# Patient Record
Sex: FEMALE | Race: WHITE | NOT HISPANIC OR LATINO | Employment: FULL TIME | URBAN - METROPOLITAN AREA
[De-identification: names, ages, dates, MRNs, and addresses within clinical notes are randomized per-mention and may not be internally consistent; named-entity substitution may affect disease eponyms.]

---

## 2022-04-26 LAB
EXTERNAL CHLAMYDIA SCREEN: NEGATIVE
EXTERNAL GONORRHEA SCREEN: NEGATIVE

## 2022-11-07 LAB — EXTERNAL GROUP B STREP ANTIGEN: POSITIVE

## 2022-11-16 ENCOUNTER — ROUTINE PRENATAL (OUTPATIENT)
Dept: OBGYN CLINIC | Facility: CLINIC | Age: 27
End: 2022-11-16

## 2022-11-16 VITALS
WEIGHT: 143.2 LBS | SYSTOLIC BLOOD PRESSURE: 102 MMHG | HEIGHT: 63 IN | DIASTOLIC BLOOD PRESSURE: 70 MMHG | BODY MASS INDEX: 25.37 KG/M2

## 2022-11-16 DIAGNOSIS — Z28.310 COVID-19 VACCINE SERIES DECLINED: ICD-10-CM

## 2022-11-16 DIAGNOSIS — Z28.21 COVID-19 VACCINE SERIES DECLINED: ICD-10-CM

## 2022-11-16 DIAGNOSIS — O99.820 GBS (GROUP B STREPTOCOCCUS CARRIER), +RV CULTURE, CURRENTLY PREGNANT: ICD-10-CM

## 2022-11-16 DIAGNOSIS — O21.9 NAUSEA AND VOMITING OF PREGNANCY, ANTEPARTUM: ICD-10-CM

## 2022-11-16 DIAGNOSIS — O99.013 ANEMIA DURING PREGNANCY IN THIRD TRIMESTER: ICD-10-CM

## 2022-11-16 DIAGNOSIS — Z3A.37 37 WEEKS GESTATION OF PREGNANCY: Primary | ICD-10-CM

## 2022-11-16 LAB
SL AMB  POCT GLUCOSE, UA: NEGATIVE
SL AMB POCT URINE PROTEIN: NEGATIVE

## 2022-11-16 NOTE — PROGRESS NOTES
Routine Prenatal Visit  [address]   [address], [address], [facility], [facility] 1    Assessment/Plan:  Yun Salas is a 24y o  year old  at 37w6d who presents for routine prenatal visit  Patient considering elective IOL the week of , will inform OB/Gyn after discussing with spouse  Last day of work 2022       1  37 weeks gestation of pregnancy  -     POCT urine dip    2  Anemia during pregnancy in third trimester    3  COVID-19 vaccine series declined    4  Nausea and vomiting of pregnancy, antepartum    5  GBS (group B Streptococcus carrier), +RV culture, currently pregnant        Next OB Visit 1 weeks  Subjective:     CC: Prenatal care    Hu Wade is a 24 y o   female who presents for routine prenatal care at 37w5d  Pregnancy ROS: No leakage of fluid, pelvic pain, or vaginal bleeding  Normal fetal movement  The following portions of the patient's history were reviewed and updated as appropriate: allergies, current medications, past family history, past medical history, obstetric history, gynecologic history, past social history, past surgical history and problem list       Objective:  /70   Ht 5' 3" (1 6 m)   Wt 65 kg (143 lb 3 2 oz)   LMP 2022 (Exact Date)   BMI 25 37 kg/m²   Pregravid Weight/BMI: 49 kg (108 lb) (BMI 19 14)  Current Weight: 65 kg (143 lb 3 2 oz)   Total Weight Gain: 16 kg (35 lb 3 2 oz)   Pre- Vitals    Flowsheet Row Most Recent Value   Prenatal Assessment    Fetal Heart Rate 140-150   Fundal Height (cm) 38 cm   Movement Present   Prenatal Vitals    Blood Pressure 102/70   Weight - Scale 65 kg (143 lb 3 2 oz)   Urine Albumin/Glucose    Dilation/Effacement/Station    Vaginal Drainage    Edema    LLE Edema Trace   RLE Edema Trace   Facial Edema None           General: Well appearing, no distress  Abdomen: Soft, gravid, nontender  Fundal Height: Appropriate for gestational age  Extremities: Warm and well perfused  Non tender

## 2022-11-21 ENCOUNTER — ROUTINE PRENATAL (OUTPATIENT)
Dept: OBGYN CLINIC | Facility: CLINIC | Age: 27
End: 2022-11-21

## 2022-11-21 VITALS — BODY MASS INDEX: 25.05 KG/M2 | DIASTOLIC BLOOD PRESSURE: 68 MMHG | SYSTOLIC BLOOD PRESSURE: 106 MMHG | WEIGHT: 141.4 LBS

## 2022-11-21 DIAGNOSIS — O99.013 ANEMIA DURING PREGNANCY IN THIRD TRIMESTER: ICD-10-CM

## 2022-11-21 DIAGNOSIS — Z3A.38 38 WEEKS GESTATION OF PREGNANCY: Primary | ICD-10-CM

## 2022-11-21 DIAGNOSIS — O99.820 GBS (GROUP B STREPTOCOCCUS CARRIER), +RV CULTURE, CURRENTLY PREGNANT: ICD-10-CM

## 2022-11-21 LAB
SL AMB  POCT GLUCOSE, UA: NEGATIVE
SL AMB POCT URINE PROTEIN: NEGATIVE

## 2022-11-21 NOTE — PROGRESS NOTES
[facility]          OUTPATIENT PHYSICAL THERAPY ORTHOPEDIC EVALUATION  PLAN OF TREATMENT FOR OUTPATIENT REHABILITATION  (COMPLETE FOR INITIAL CLAIMS ONLY)  Patient's Last Name, First Name, M.I.  YOB: 1951  XiangIna  CLAUDINE    Provider s Name:  [facility]   Medical Record No.  [id number]   Start of Care Date:  09/20/18   Onset Date:  08/20/18   Type:     _X__PT   ___OT   ___SLP Medical Diagnosis:  History of right total knee replacement     PT Diagnosis:  s/p right TKA 8/20/18   Visits from SOC:  1      _________________________________________________________________________________  Plan of Treatment/Functional Goals:  ADL retraining, balance training, gait training, joint mobilization, manual therapy, motor coordination training, neuromuscular re-education, ROM, strengthening, stretching     Cryotherapy     Goals     Goal Description: Patient will have 0-125 degrees of right knee ROM to allow for normalized gait.  Target Date: 10/11/18       Goal Description: Patient will be able to perform tandem stance for >=30 seconds.  Target Date: 11/01/18       Goal Description: Patient will be independent with her HEP to reduce future occurrence of pain and disability.  Target Date: 10/11/18         Therapy Frequency:  1 time/week  Predicted Duration of Therapy Intervention:  8 weeks    Leeanna Jiang, PT                 I CERTIFY THE NEED FOR THESE SERVICES FURNISHED UNDER        THIS PLAN OF TREATMENT AND WHILE UNDER MY CARE     (Physician co-signature of this document indicates review and certification of the therapy plan).                       Certification Date From:  09/20/18   Certification Date To:  11/15/18    Referring Provider:  Verito Ashby MD    Initial Assessment        See Epic Evaluation Start of Care Date: 09/20/18                                                                               Routine Prenatal Visit  [address]   [address] [facility], [place], [address]    Assessment/Plan:  Kelsy Chou is a 24y o  year old  at 36w4d who presents for routine prenatal visit  1  38 weeks gestation of pregnancy  Assessment & Plan:  - Labor/Bleeding/ROM precautions given  Kick counts reviewed  - GBS positive result reviewed  - Reviewed timing of delivery, including option for elective induction of labor as early as 39 weeks versus awaiting spontaneous onset of labor  Patient desires induction of labor  Tentatively scheduled for cervical ripening  at 20:00  Given unfavorable cervix, she will return to office in the morning on  for cervical exam    - Problem list updated, results console reviewed and updated with pertinent prenatal labs  - PMH, PSH, medications reviewed and updated as needed  - Return to office in 1 wk(s) for routine prenatal care      Orders:  -     POCT urine dip    2  GBS (group B Streptococcus carrier), +RV culture, currently pregnant    3  Anemia during pregnancy in third trimester  Assessment & Plan:  - Continue oral iron supplementation            Subjective:   Amy Cannon is a 24 y o   who presents for routine prenatal care at 38w3d  Complaints today: No  LOF: No; VB: No; Contractions: No; FM: Present and normal    Objective:  /68   Wt 64 1 kg (141 lb 6 4 oz)   LMP 2022 (Exact Date)   BMI 25 05 kg/m²     General: Well appearing, no distress  Respiratory: Unlabored breathing  Cardiovascular: Regular rate  Abdomen: Soft, gravid, nontender  Extremities: Warm and well perfused  Non tender      Pregravid Weight/BMI: 49 kg (108 lb) (BMI 19 14)  Current Weight: 64 1 kg (141 lb 6 4 oz)   Total Weight Gain: 15 2 kg (33 lb 6 4 oz)     Pre- Vitals    Flowsheet Row Most Recent Value   Prenatal Assessment    Fetal Heart Rate 120   Fundal Height (cm) 38 cm   Movement Present   Presentation Vertex   Prenatal Vitals    Blood Pressure 106/68   Weight - Scale 64 1 kg (141 lb 6 4 oz)   Urine Albumin/Glucose    Dilation/Effacement/Station    Cervical Dilation 0   Cervical Effacement 50   Fetal Station -3   Vaginal Drainage    Draining Fluid No   Edema    LLE Edema None   RLE Edema None   Facial Edema None           Maeve Estrada MD  11/21/2022 12:22 PM

## 2022-11-21 NOTE — ASSESSMENT & PLAN NOTE
- Labor/Bleeding/ROM precautions given  Kick counts reviewed  - GBS positive result reviewed  - Reviewed timing of delivery, including option for elective induction of labor as early as 39 weeks versus awaiting spontaneous onset of labor  Patient desires induction of labor  Tentatively scheduled for cervical ripening 11/28 at 20:00  Given unfavorable cervix, she will return to office in the morning on 11/28 for cervical exam    - Problem list updated, results console reviewed and updated with pertinent prenatal labs    - PMH, PSH, medications reviewed and updated as needed  - Return to office in 1 wk(s) for routine prenatal care

## 2022-11-28 ENCOUNTER — HOSPITAL ENCOUNTER (INPATIENT)
Facility: HOSPITAL | Age: 27
LOS: 3 days | Discharge: HOME | End: 2022-12-01
Attending: OBSTETRICS & GYNECOLOGY | Admitting: OBSTETRICS & GYNECOLOGY

## 2022-11-28 ENCOUNTER — ANESTHESIA EVENT (INPATIENT)
Dept: ANESTHESIOLOGY | Facility: HOSPITAL | Age: 27
End: 2022-11-28

## 2022-11-28 ENCOUNTER — ANESTHESIA (INPATIENT)
Dept: ANESTHESIOLOGY | Facility: HOSPITAL | Age: 27
End: 2022-11-28

## 2022-11-28 ENCOUNTER — HOSPITAL ENCOUNTER (EMERGENCY)
Dept: LABOR AND DELIVERY | Facility: HOSPITAL | Age: 27
Discharge: HOME | End: 2022-11-28

## 2022-11-28 ENCOUNTER — ROUTINE PRENATAL (OUTPATIENT)
Dept: OBGYN CLINIC | Facility: CLINIC | Age: 27
End: 2022-11-28

## 2022-11-28 VITALS — SYSTOLIC BLOOD PRESSURE: 100 MMHG | BODY MASS INDEX: 25.08 KG/M2 | WEIGHT: 141.6 LBS | DIASTOLIC BLOOD PRESSURE: 70 MMHG

## 2022-11-28 DIAGNOSIS — Z34.90 ENCOUNTER FOR ELECTIVE INDUCTION OF LABOR: Primary | ICD-10-CM

## 2022-11-28 DIAGNOSIS — O99.013 ANEMIA DURING PREGNANCY IN THIRD TRIMESTER: Primary | ICD-10-CM

## 2022-11-28 DIAGNOSIS — O99.820 GBS (GROUP B STREPTOCOCCUS CARRIER), +RV CULTURE, CURRENTLY PREGNANT: ICD-10-CM

## 2022-11-28 DIAGNOSIS — M79.609 LIMB PAIN: ICD-10-CM

## 2022-11-28 DIAGNOSIS — Z3A.39 39 WEEKS GESTATION OF PREGNANCY: ICD-10-CM

## 2022-11-28 LAB
ABO GROUP BLD: NORMAL
BASOPHILS # BLD AUTO: 0.01 THOUSANDS/ÂΜL (ref 0–0.1)
BASOPHILS NFR BLD AUTO: 0 % (ref 0–1)
BLD GP AB SCN SERPL QL: NEGATIVE
EOSINOPHIL # BLD AUTO: 0.04 THOUSAND/ÂΜL (ref 0–0.61)
EOSINOPHIL NFR BLD AUTO: 0 % (ref 0–6)
ERYTHROCYTE [DISTWIDTH] IN BLOOD BY AUTOMATED COUNT: 13 % (ref 11.6–15.1)
HCT VFR BLD AUTO: 35.7 % (ref 34.8–46.1)
HGB BLD-MCNC: 12.1 G/DL (ref 11.5–15.4)
IMM GRANULOCYTES # BLD AUTO: 0.03 THOUSAND/UL (ref 0–0.2)
IMM GRANULOCYTES NFR BLD AUTO: 0 % (ref 0–2)
LYMPHOCYTES # BLD AUTO: 1.8 THOUSANDS/ÂΜL (ref 0.6–4.47)
LYMPHOCYTES NFR BLD AUTO: 19 % (ref 14–44)
MCH RBC QN AUTO: 31.6 PG (ref 26.8–34.3)
MCHC RBC AUTO-ENTMCNC: 33.9 G/DL (ref 31.4–37.4)
MCV RBC AUTO: 93 FL (ref 82–98)
MONOCYTES # BLD AUTO: 0.39 THOUSAND/ÂΜL (ref 0.17–1.22)
MONOCYTES NFR BLD AUTO: 4 % (ref 4–12)
NEUTROPHILS # BLD AUTO: 7.3 THOUSANDS/ÂΜL (ref 1.85–7.62)
NEUTS SEG NFR BLD AUTO: 77 % (ref 43–75)
NRBC BLD AUTO-RTO: 0 /100 WBCS
PLATELET # BLD AUTO: 219 THOUSANDS/UL (ref 149–390)
PMV BLD AUTO: 11.4 FL (ref 8.9–12.7)
RBC # BLD AUTO: 3.83 MILLION/UL (ref 3.81–5.12)
RH BLD: POSITIVE
SL AMB  POCT GLUCOSE, UA: NEGATIVE
SL AMB POCT URINE PROTEIN: NEGATIVE
SPECIMEN EXPIRATION DATE: NORMAL
WBC # BLD AUTO: 9.57 THOUSAND/UL (ref 4.31–10.16)

## 2022-11-28 PROCEDURE — 4A1HXCZ MONITORING OF PRODUCTS OF CONCEPTION, CARDIAC RATE, EXTERNAL APPROACH: ICD-10-PCS | Performed by: OBSTETRICS & GYNECOLOGY

## 2022-11-28 RX ORDER — SODIUM CHLORIDE, SODIUM LACTATE, POTASSIUM CHLORIDE, CALCIUM CHLORIDE 600; 310; 30; 20 MG/100ML; MG/100ML; MG/100ML; MG/100ML
125 INJECTION, SOLUTION INTRAVENOUS CONTINUOUS
Status: DISCONTINUED | OUTPATIENT
Start: 2022-11-28 | End: 2022-11-29

## 2022-11-28 RX ORDER — ONDANSETRON 2 MG/ML
4 INJECTION INTRAMUSCULAR; INTRAVENOUS EVERY 6 HOURS PRN
Status: DISCONTINUED | OUTPATIENT
Start: 2022-11-28 | End: 2022-11-29

## 2022-11-28 RX ORDER — BUTORPHANOL TARTRATE 1 MG/ML
1 INJECTION, SOLUTION INTRAMUSCULAR; INTRAVENOUS
Status: DISCONTINUED | OUTPATIENT
Start: 2022-11-28 | End: 2022-11-29

## 2022-11-28 RX ADMIN — Medication 50 MCG: at 21:12

## 2022-11-28 NOTE — ASSESSMENT & PLAN NOTE
- Labor/Bleeding/ROM precautions given  Kick counts reviewed  - GBS positive result reviewed  - Patient is scheduled for induction tonight  Her cervix remains unfavorable today and will therefore need cervical ripening  We discussed west balloon and/or misoprostol for cervical ripening followed by pitocin  Induction consent reviewed and signed with patient today  - Problem list updated, results console reviewed and updated with pertinent prenatal labs  - PMH, PSH, medications reviewed and updated as needed  - Return to office for postpartum care

## 2022-11-28 NOTE — PROGRESS NOTES
Routine Prenatal Visit   [address]    [address] [facility], [address]    Assessment/Plan:  Caridad Britt is a 24y o  year old  at 38w3d who presents for routine prenatal visit  1  Anemia during pregnancy in third trimester  Assessment & Plan:  - Continue oral iron supplementation  2  39 weeks gestation of pregnancy  Assessment & Plan:  - Labor/Bleeding/ROM precautions given  Kick counts reviewed  - GBS positive result reviewed  - Patient is scheduled for induction tonight  Her cervix remains unfavorable today and will therefore need cervical ripening  We discussed west balloon and/or misoprostol for cervical ripening followed by pitocin  Induction consent reviewed and signed with patient today  - Problem list updated, results console reviewed and updated with pertinent prenatal labs  - PMH, PSH, medications reviewed and updated as needed  - Return to office for postpartum care  Orders:  -     POCT urine dip    3  GBS (group B Streptococcus carrier), +RV culture, currently pregnant          Subjective:   Brenda Carty is a 24 y o   who presents for routine prenatal care at 39w3d  Complaints today: No  LOF: No; VB: No; Contractions: No; FM: Present and normal    Objective:  /70   Wt 64 2 kg (141 lb 9 6 oz)   LMP 2022 (Exact Date)   BMI 25 08 kg/m²     General: Well appearing, no distress  Respiratory: Unlabored breathing  Cardiovascular: Regular rate  Abdomen: Soft, gravid, nontender  Extremities: Warm and well perfused  Non tender      Pregravid Weight/BMI: 49 kg (108 lb) (BMI 19 14)  Current Weight: 64 2 kg (141 lb 9 6 oz)   Total Weight Gain: 15 2 kg (33 lb 9 6 oz)     Pre-Tiny Vitals    Flowsheet Row Most Recent Value   Prenatal Assessment    Fetal Heart Rate 135   Fundal Height (cm) 39 cm   Movement Present   Presentation Vertex   Prenatal Vitals    Blood Pressure 100/70   Weight - Scale 64 2 kg (141 lb 9 6 oz)   Urine Albumin/Glucose Dilation/Effacement/Station    Cervical Dilation 0   Cervical Effacement 50   Fetal Station -3   Vaginal Drainage    Draining Fluid No   Edema    LLE Edema None   RLE Edema None   Facial Edema None           Cornelius Ayala MD  11/28/2022 10:03 AM

## 2022-11-29 LAB
BASE EXCESS BLDCOA CALC-SCNC: -8.7 MMOL/L (ref 3–11)
BASE EXCESS BLDCOV CALC-SCNC: -8.2 MMOL/L (ref 1–9)
HCO3 BLDCOA-SCNC: 21 MMOL/L (ref 17.3–27.3)
HCO3 BLDCOV-SCNC: 19.3 MMOL/L (ref 12.2–28.6)
O2 CT VFR BLDCOA CALC: 3.6 ML/DL
OXYHGB MFR BLDCOA: 15.9 %
OXYHGB MFR BLDCOV: 52.6 %
PCO2 BLDCOA: 60.6 MM[HG] (ref 30–60)
PCO2 BLDCOV: 46.8 MM HG (ref 27–43)
PH BLDCOA: 7.16 [PH] (ref 7.23–7.43)
PH BLDCOV: 7.23 [PH] (ref 7.19–7.49)
PO2 BLDCOA: 13.3 MM HG (ref 5–25)
PO2 BLDCOV: 25.3 MM HG (ref 15–45)
RPR SER QL: NORMAL
SAO2 % BLDCOV: 12.4 ML/DL

## 2022-11-29 PROCEDURE — 0KQM0ZZ REPAIR PERINEUM MUSCLE, OPEN APPROACH: ICD-10-PCS | Performed by: OBSTETRICS & GYNECOLOGY

## 2022-11-29 PROCEDURE — 3E0E7GC INTRODUCTION OF OTHER THERAPEUTIC SUBSTANCE INTO PRODUCTS OF CONCEPTION, VIA NATURAL OR ARTIFICIAL OPENING: ICD-10-PCS | Performed by: OBSTETRICS & GYNECOLOGY

## 2022-11-29 PROCEDURE — 3E033VJ INTRODUCTION OF OTHER HORMONE INTO PERIPHERAL VEIN, PERCUTANEOUS APPROACH: ICD-10-PCS | Performed by: OBSTETRICS & GYNECOLOGY

## 2022-11-29 PROCEDURE — 3E0DXGC INTRODUCTION OF OTHER THERAPEUTIC SUBSTANCE INTO MOUTH AND PHARYNX, EXTERNAL APPROACH: ICD-10-PCS | Performed by: OBSTETRICS & GYNECOLOGY

## 2022-11-29 PROCEDURE — 10H07YZ INSERTION OF OTHER DEVICE INTO PRODUCTS OF CONCEPTION, VIA NATURAL OR ARTIFICIAL OPENING: ICD-10-PCS | Performed by: OBSTETRICS & GYNECOLOGY

## 2022-11-29 PROCEDURE — 10907ZC DRAINAGE OF AMNIOTIC FLUID, THERAPEUTIC FROM PRODUCTS OF CONCEPTION, VIA NATURAL OR ARTIFICIAL OPENING: ICD-10-PCS | Performed by: OBSTETRICS & GYNECOLOGY

## 2022-11-29 RX ORDER — ACETAMINOPHEN 325 MG/1
650 TABLET ORAL EVERY 4 HOURS PRN
Status: DISCONTINUED | OUTPATIENT
Start: 2022-11-29 | End: 2022-12-01 | Stop reason: HOSPADM

## 2022-11-29 RX ORDER — FENTANYL CITRATE 50 UG/ML
INJECTION, SOLUTION INTRAMUSCULAR; INTRAVENOUS AS NEEDED
Status: DISCONTINUED | OUTPATIENT
Start: 2022-11-29 | End: 2022-11-29

## 2022-11-29 RX ORDER — BUPIVACAINE HYDROCHLORIDE 2.5 MG/ML
INJECTION, SOLUTION EPIDURAL; INFILTRATION; INTRACAUDAL
Status: COMPLETED | OUTPATIENT
Start: 2022-11-29 | End: 2022-11-29

## 2022-11-29 RX ORDER — DIAPER,BRIEF,INFANT-TODD,DISP
1 EACH MISCELLANEOUS DAILY PRN
Status: DISCONTINUED | OUTPATIENT
Start: 2022-11-29 | End: 2022-12-01 | Stop reason: HOSPADM

## 2022-11-29 RX ORDER — DIPHENHYDRAMINE HCL 25 MG
25 TABLET ORAL EVERY 6 HOURS PRN
Status: DISCONTINUED | OUTPATIENT
Start: 2022-11-29 | End: 2022-12-01 | Stop reason: HOSPADM

## 2022-11-29 RX ORDER — LIDOCAINE HYDROCHLORIDE AND EPINEPHRINE 15; 5 MG/ML; UG/ML
INJECTION, SOLUTION EPIDURAL AS NEEDED
Status: DISCONTINUED | OUTPATIENT
Start: 2022-11-29 | End: 2022-11-29 | Stop reason: HOSPADM

## 2022-11-29 RX ORDER — IBUPROFEN 600 MG/1
600 TABLET ORAL EVERY 6 HOURS PRN
Status: DISCONTINUED | OUTPATIENT
Start: 2022-11-29 | End: 2022-12-01 | Stop reason: HOSPADM

## 2022-11-29 RX ORDER — OXYTOCIN/RINGER'S LACTATE 30/500 ML
1-30 PLASTIC BAG, INJECTION (ML) INTRAVENOUS
Status: DISCONTINUED | OUTPATIENT
Start: 2022-11-29 | End: 2022-11-29

## 2022-11-29 RX ORDER — DOCUSATE SODIUM 100 MG/1
100 CAPSULE, LIQUID FILLED ORAL 2 TIMES DAILY
Status: DISCONTINUED | OUTPATIENT
Start: 2022-11-29 | End: 2022-12-01 | Stop reason: HOSPADM

## 2022-11-29 RX ORDER — LIDOCAINE HYDROCHLORIDE 10 MG/ML
INJECTION, SOLUTION EPIDURAL; INFILTRATION; INTRACAUDAL; PERINEURAL
Status: COMPLETED | OUTPATIENT
Start: 2022-11-29 | End: 2022-11-29

## 2022-11-29 RX ORDER — METOCLOPRAMIDE HYDROCHLORIDE 5 MG/ML
10 INJECTION INTRAMUSCULAR; INTRAVENOUS ONCE
Status: COMPLETED | OUTPATIENT
Start: 2022-11-29 | End: 2022-11-29

## 2022-11-29 RX ORDER — MAGNESIUM HYDROXIDE 1200 MG/15ML
1000 LIQUID ORAL ONCE
Status: COMPLETED | OUTPATIENT
Start: 2022-11-29 | End: 2022-11-29

## 2022-11-29 RX ORDER — CALCIUM CARBONATE 200(500)MG
1000 TABLET,CHEWABLE ORAL DAILY PRN
Status: DISCONTINUED | OUTPATIENT
Start: 2022-11-29 | End: 2022-12-01 | Stop reason: HOSPADM

## 2022-11-29 RX ORDER — ONDANSETRON 2 MG/ML
4 INJECTION INTRAMUSCULAR; INTRAVENOUS EVERY 8 HOURS PRN
Status: DISCONTINUED | OUTPATIENT
Start: 2022-11-29 | End: 2022-12-01 | Stop reason: HOSPADM

## 2022-11-29 RX ORDER — FENTANYL CITRATE 50 UG/ML
INJECTION, SOLUTION INTRAMUSCULAR; INTRAVENOUS AS NEEDED
Status: DISCONTINUED | OUTPATIENT
Start: 2022-11-29 | End: 2022-11-29 | Stop reason: HOSPADM

## 2022-11-29 RX ORDER — ROPIVACAINE HYDROCHLORIDE 2 MG/ML
INJECTION, SOLUTION EPIDURAL; INFILTRATION; PERINEURAL CONTINUOUS PRN
Status: DISCONTINUED | OUTPATIENT
Start: 2022-11-29 | End: 2022-11-29 | Stop reason: HOSPADM

## 2022-11-29 RX ADMIN — BUTORPHANOL TARTRATE 1 MG: 1 INJECTION, SOLUTION INTRAMUSCULAR; INTRAVENOUS at 06:06

## 2022-11-29 RX ADMIN — HYDROCORTISONE 1 APPLICATION: 1 CREAM TOPICAL at 23:28

## 2022-11-29 RX ADMIN — SODIUM CHLORIDE 2.5 MILLION UNITS: 9 INJECTION, SOLUTION INTRAVENOUS at 13:54

## 2022-11-29 RX ADMIN — SODIUM CHLORIDE 5 MILLION UNITS: 0.9 INJECTION, SOLUTION INTRAVENOUS at 08:20

## 2022-11-29 RX ADMIN — BUPIVACAINE HYDROCHLORIDE 3 ML: 2.5 INJECTION, SOLUTION EPIDURAL; INFILTRATION; INTRACAUDAL; PERINEURAL at 13:45

## 2022-11-29 RX ADMIN — ROPIVACAINE HYDROCHLORIDE: 2 INJECTION, SOLUTION EPIDURAL; INFILTRATION at 19:50

## 2022-11-29 RX ADMIN — SODIUM CHLORIDE, POTASSIUM CHLORIDE, SODIUM LACTATE AND CALCIUM CHLORIDE 999 ML/HR: 600; 310; 30; 20 INJECTION, SOLUTION INTRAVENOUS at 13:18

## 2022-11-29 RX ADMIN — LIDOCAINE HYDROCHLORIDE 3 ML: 10 INJECTION, SOLUTION EPIDURAL; INFILTRATION; INTRACAUDAL; PERINEURAL at 13:40

## 2022-11-29 RX ADMIN — FENTANYL CITRATE 100 MCG: 50 INJECTION, SOLUTION INTRAMUSCULAR; INTRAVENOUS at 13:48

## 2022-11-29 RX ADMIN — METOCLOPRAMIDE 10 MG: 5 INJECTION, SOLUTION INTRAMUSCULAR; INTRAVENOUS at 19:25

## 2022-11-29 RX ADMIN — ROPIVACAINE HYDROCHLORIDE: 2 INJECTION, SOLUTION EPIDURAL; INFILTRATION at 13:58

## 2022-11-29 RX ADMIN — SODIUM CHLORIDE, POTASSIUM CHLORIDE, SODIUM LACTATE AND CALCIUM CHLORIDE 125 ML/HR: 600; 310; 30; 20 INJECTION, SOLUTION INTRAVENOUS at 04:39

## 2022-11-29 RX ADMIN — SODIUM CHLORIDE 2.5 MILLION UNITS: 9 INJECTION, SOLUTION INTRAVENOUS at 18:14

## 2022-11-29 RX ADMIN — BENZOCAINE AND LEVOMENTHOL 1 APPLICATION: 200; 5 SPRAY TOPICAL at 23:28

## 2022-11-29 RX ADMIN — SODIUM CHLORIDE, POTASSIUM CHLORIDE, SODIUM LACTATE AND CALCIUM CHLORIDE 999 ML/HR: 600; 310; 30; 20 INJECTION, SOLUTION INTRAVENOUS at 18:14

## 2022-11-29 RX ADMIN — Medication 2 MILLI-UNITS/MIN: at 10:30

## 2022-11-29 RX ADMIN — BUPIVACAINE HYDROCHLORIDE 5 ML: 2.5 INJECTION, SOLUTION EPIDURAL; INFILTRATION; INTRACAUDAL; PERINEURAL at 13:42

## 2022-11-29 RX ADMIN — SODIUM CHLORIDE, POTASSIUM CHLORIDE, SODIUM LACTATE AND CALCIUM CHLORIDE 999 ML/HR: 600; 310; 30; 20 INJECTION, SOLUTION INTRAVENOUS at 02:08

## 2022-11-29 RX ADMIN — WITCH HAZEL 1 PAD: 500 SOLUTION RECTAL; TOPICAL at 23:28

## 2022-11-29 RX ADMIN — SODIUM CHLORIDE, POTASSIUM CHLORIDE, SODIUM LACTATE AND CALCIUM CHLORIDE 999 ML/HR: 600; 310; 30; 20 INJECTION, SOLUTION INTRAVENOUS at 16:52

## 2022-11-29 RX ADMIN — BUPIVACAINE HYDROCHLORIDE 5 ML: 2.5 INJECTION, SOLUTION EPIDURAL; INFILTRATION; INTRACAUDAL; PERINEURAL at 14:44

## 2022-11-29 RX ADMIN — SODIUM CHLORIDE 1000 ML: 0.9 IRRIGANT IRRIGATION at 16:08

## 2022-11-29 RX ADMIN — ONDANSETRON 4 MG: 2 INJECTION INTRAMUSCULAR; INTRAVENOUS at 14:28

## 2022-11-29 RX ADMIN — ROPIVACAINE HYDROCHLORIDE 10 ML/HR: 2 INJECTION, SOLUTION EPIDURAL; INFILTRATION at 13:49

## 2022-11-29 RX ADMIN — ONDANSETRON 4 MG: 2 INJECTION INTRAMUSCULAR; INTRAVENOUS at 09:02

## 2022-11-29 RX ADMIN — LIDOCAINE HYDROCHLORIDE AND EPINEPHRINE 3 ML: 15; 5 INJECTION, SOLUTION EPIDURAL at 13:44

## 2022-11-29 NOTE — OB LABOR/OXYTOCIN SAFETY PROGRESS
Oxytocin Safety Progress Check Note - Lizett Shelter 24 y o  female MRN: [id number]    Unit/Bed#: -01 Encounter: [id number]       Contraction Frequency (minutes): 1 5-3 5  Contraction Quality: Mild  Tachysystole: No   Cervical Dilation: Closed        Cervical Effacement: 90  Fetal Station: -1  Baseline Rate: 115 bpm  Fetal Heart Rate: 120 BPM  FHR Category: Category I               Vital Signs:   Vitals:    11/29/22 0239   BP: 132/87   Pulse: 64   Resp: 18   Temp: 97 7 °F (36 5 °C)   SpO2: 98%       Notes/comments:     Ctx still q 2 min mostly, occasionally q 3,moderately uncomfortable  Cannot use cytotec now but if continues to space out can give another dose  Unable to pass balloon as closed    Stephanie Gaming DO 11/29/2022 5:32 AM

## 2022-11-29 NOTE — PROGRESS NOTES
11/29/22 1038   Oxytocin Safety Bundle   Oxytocin Initiation Type Induction   Indications for Induction Elective (>39 wks GA only)   Induction/Augmentation Consent Completed Yes   Estimated Fetal Weight 7 25 lbs   Baseline Rate 115 bpm   FHR Category Category I   Contraction Frequency (minutes) 2-5   Contraction Duration (seconds) 20-80   Contraction Quality Mild   Tachysystole No   Estrella Score   Cervical Consistency 1   Cervical Dilation 1 5   Cervical Effacement 70   Cervical Position 1   Fetal Station -3   Estrella Score 5   Oxytocin Safety Bundle Completion   Oxytocin Safety Bundle complete? Yes   Safe to proceed?  Yes     Francis Saunders MD  11/29/2022 10:39 AM

## 2022-11-29 NOTE — OB LABOR/OXYTOCIN SAFETY PROGRESS
Oxytocin Safety Progress Check Note - Iftikhar Dominguez 24 y o  female MRN: [id number]    Unit/Bed#: -01 Encounter: [id number]       Contraction Frequency (minutes): 2-4  Contraction Quality: Mild  Tachysystole: No   Cervical Dilation: Closed        Cervical Effacement: 50  Fetal Station: -2  Baseline Rate: 115 bpm  Fetal Heart Rate: 124 BPM  FHR Category: Category I               Vital Signs:   Vitals:    11/28/22 2246   BP:    Pulse: 63   Resp:    Temp:    SpO2: 99%       Notes/comments:      Cervix still closed but has effaced and moved more anterior  Head lower    Will order another cytotec but will not give yet until ctx space out- currently q 2 min and uncomfortable    Ava Prasad DO 11/29/2022 1:21 AM

## 2022-11-29 NOTE — OB LABOR/OXYTOCIN SAFETY PROGRESS
Oxytocin Safety Progress Check Note - Romeo Landaverde 24 y o  female MRN: [id number]    Unit/Bed#: -01 Encounter: [id number]    Dose (avsi-units/min) Oxytocin: 6 avis-units/min  Contraction Frequency (minutes): 2-3  Contraction Quality: Mild  Tachysystole: No   Cervical Dilation: 3-4        Cervical Effacement: 70  Fetal Station: -2  Baseline Rate: 110 bpm  Fetal Heart Rate: 110 BPM  FHR Category: Category I           Vital Signs:   Vitals:    11/29/22 1200   BP: 137/94   Pulse: 59   Resp:    Temp:    SpO2:        Notes/comments:   - Fetal and maternal status reassuring    - Salomon balloon out at time of exam at 13:00    - Reviewed AROM as next step in induction  Patient is considering epidural and requests time to consider options with her partner    - Will plan for AROM when patient ready  Analgesia PRN  - Continue pitocin titration            Merline Lefort, MD 11/29/2022 1:05 PM

## 2022-11-29 NOTE — ANESTHESIA PREPROCEDURE EVALUATION
Procedure:  LABOR ANALGESIA    Relevant Problems   GYN   (+) 31 weeks gestation of pregnancy   (+) 39 weeks gestation of pregnancy      HEMATOLOGY   (+) Anemia during pregnancy in third trimester        Physical Exam    Airway    Mallampati score: I  TM Distance: >3 FB  Neck ROM: full     Dental   No notable dental hx     Cardiovascular  Cardiovascular exam normal    Pulmonary  Pulmonary exam normal     Other Findings        Anesthesia Plan  ASA Score- 2     Anesthesia Type- epidural with ASA Monitors  Additional Monitors:   Airway Plan:           Plan Factors-    Chart reviewed  Existing labs reviewed  Patient summary reviewed  Patient is not a current smoker  Induction-     Postoperative Plan-     Informed Consent- Anesthetic plan and risks discussed with patient

## 2022-11-29 NOTE — PLAN OF CARE
Problem: PAIN - ADULT  Goal: Verbalizes/displays adequate comfort level or baseline comfort level  Description: Interventions:  - Encourage patient to monitor pain and request assistance  - Assess pain using appropriate pain scale  - Administer analgesics based on type and severity of pain and evaluate response  - Implement non-pharmacological measures as appropriate and evaluate response  - Consider cultural and social influences on pain and pain management  - Notify physician/advanced practitioner if interventions unsuccessful or patient reports new pain  Outcome: Progressing     Problem: INFECTION - ADULT  Goal: Absence or prevention of progression during hospitalization  Description: INTERVENTIONS:  - Assess and monitor for signs and symptoms of infection  - Monitor lab/diagnostic results  - Monitor all insertion sites, i e  indwelling lines, tubes, and drains  - Monitor endotracheal if appropriate and nasal secretions for changes in amount and color  - [place] appropriate cooling/warming therapies per order  - Administer medications as ordered  - Instruct and encourage patient and family to use good hand hygiene technique  - Identify and instruct in appropriate isolation precautions for identified infection/condition  Outcome: Progressing  Goal: Absence of fever/infection during neutropenic period  Description: INTERVENTIONS:  - Monitor WBC    Outcome: Progressing     Problem: SAFETY ADULT  Goal: Patient will remain free of falls  Description: INTERVENTIONS:  - Educate patient/family on patient safety including physical limitations  - Instruct patient to call for assistance with activity   - Consult OT/PT to assist with strengthening/mobility   - Keep Call bell within reach  - Keep bed low and locked with side rails adjusted as appropriate  - Keep care items and personal belongings within reach  - Initiate and maintain comfort rounds  - Make Fall Risk Sign visible to staff  - Apply yellow socks and bracelet for high fall risk patients  - Consider moving patient to room near nurses station  Outcome: Progressing  Goal: Maintain or return to baseline ADL function  Description: INTERVENTIONS:  -  Assess patient's ability to carry out ADLs; assess patient's baseline for ADL function and identify physical deficits which impact ability to perform ADLs (bathing, care of mouth/teeth, toileting, grooming, dressing, etc )  - Assess/evaluate cause of self-care deficits   - Assess range of motion  - Assess patient's mobility; develop plan if impaired  - Assess patient's need for assistive devices and provide as appropriate  - Encourage maximum independence but intervene and supervise when necessary  - Involve family in performance of ADLs  - Assess for home care needs following discharge   - Consider OT consult to assist with ADL evaluation and planning for discharge  - Provide patient education as appropriate  Outcome: Progressing  Goal: Maintains/Returns to pre admission functional level  Description: INTERVENTIONS:  - Perform BMAT or MOVE assessment daily    - Set and communicate daily mobility goal to care team and patient/family/caregiver  - Collaborate with rehabilitation services on mobility goals if consulted  - Out of bed for toileting  - Record patient progress and toleration of activity level   Outcome: Progressing     Problem: Knowledge Deficit  Goal: Patient/family/caregiver demonstrates understanding of disease process, treatment plan, medications, and discharge instructions  Description: Complete learning assessment and assess knowledge base  Interventions:  - Provide teaching at level of understanding  - Provide teaching via preferred learning methods  Outcome: Progressing  Goal: Verbalizes understanding of labor plan  Description: Assess patient/family/caregiver's baseline knowledge level and ability to understand information    Provide education via patient/family/caregiver's preferred learning method at appropriate level of understanding  1  Provide teaching at level of understanding  2  Provide teaching via preferred learning method(s)  Outcome: Progressing     Problem: DISCHARGE PLANNING  Goal: Discharge to home or other facility with appropriate resources  Description: INTERVENTIONS:  - Identify barriers to discharge w/patient and caregiver  - Arrange for needed discharge resources and transportation as appropriate  - Identify discharge learning needs (meds, wound care, etc )  - Arrange for interpretive services to assist at discharge as needed  - Refer to Case Management Department for coordinating discharge planning if the patient needs post-hospital services based on physician/advanced practitioner order or complex needs related to functional status, cognitive ability, or social support system  Outcome: Progressing     Problem: Labor & Delivery  Goal: Manages discomfort  Description: Assess and monitor for signs and symptoms of discomfort  Assess patient's pain level regularly and per hospital policy  Administer medications as ordered  Support use of nonpharmacological methods to help control pain such as distraction, imagery, relaxation, and application of heat and cold  Collaborate with interdisciplinary team and patient to determine appropriate pain management plan  1  Include patient in decisions related to comfort  2  Offer non-pharmacological pain management interventions  3  Report ineffective pain management to physician  Outcome: Progressing  Goal: Patient vital signs are stable  Description: 1  Assess vital signs - vaginal delivery    Outcome: Progressing     Problem: BIRTH - VAGINAL/ SECTION  Goal: Fetal and maternal status remain reassuring during the birth process  Description: INTERVENTIONS:  - Monitor vital signs  - Monitor fetal heart rate  - Monitor uterine activity  - Monitor labor progression (vaginal delivery)  - DVT prophylaxis  - Antibiotic prophylaxis  Outcome: Progressing  Goal: Emotionally satisfying birthing experience for mother/fetus  Description: Interventions:  - Assess, plan, implement and evaluate the nursing care given to the patient in labor  - Advocate the philosophy that each childbirth experience is a unique experience and support the family's chosen level of involvement and control during the labor process   - Actively participate in both the patient's and family's teaching of the birth process  - Consider cultural, Yazidism and age-specific factors and plan care for the patient in labor  Outcome: Progressing

## 2022-11-29 NOTE — OB LABOR/OXYTOCIN SAFETY PROGRESS
Oxytocin Safety Progress Check Note - Humaira Carlson 24 y o  female MRN: [id number]    Unit/Bed#: -01 Encounter: [id number]    Dose (avis-units/min) Oxytocin: 6 avis-units/min  Contraction Frequency (minutes): 2-3  Contraction Quality: Moderate  Tachysystole: No   Cervical Dilation: 5-6        Cervical Effacement: 80  Fetal Station: -2  Baseline Rate: 115 bpm  Fetal Heart Rate: 110 BPM  FHR Category: Category II           Vital Signs:   Vitals:    11/29/22 1402   BP: 113/55   Pulse: 62   Resp:    Temp:    SpO2:        Notes/comments:   SAFETY HUDDLE:  TRIGGER: Category 2 FHR tracing    PARTICIPANTS: Madeleine Mario RN; Steve Patterson RN; Chelsey Fraga RN; Berlin James RN; Guerda Mcfarland MD    REVIEW OF CURRENT PLAN OF CARE AND MANAGEMENT: Currently, moderate variability and accelerations is/are present  The tracing has been category II for 30 minutes due to variable decelerations  At this time, resuscitative measures are being employed, including maternal repositioning, IV fluid bolus, and insertion of IUPC with initiation of amnioinfusion  I recommend continued resuscitation and observation because the decelerations have been occurring for less than one hour       TIMELINE FOR NEXT ASSESSMENT: Ongoing    ALL PARTICIPANTS IN AGREEMENT WITH PLAN OF CARE: Yes    IS PERRT REQUIRED: No     Sherlyn Cardenas MD 11/29/2022 4:08 PM

## 2022-11-29 NOTE — ANESTHESIA PROCEDURE NOTES
Epidural Block    Patient location during procedure: OB  Start time: 11/29/2022 1:44 PM  Staffing  Performed: Anesthesiologist   Anesthesiologist: Cornelio Deluca DO  Preanesthetic Checklist  Completed: patient identified, IV checked, site marked, risks and benefits discussed, surgical consent, monitors and equipment checked, pre-op evaluation and timeout performed  Epidural  Patient position: sitting  Prep: ChloraPrep  Patient monitoring: heart rate, continuous pulse ox and frequent blood pressure checks  Approach: midline  Location: lumbar  Injection technique: PANKAJ air  Needle  Needle type: Tuohy   Needle gauge: 17 G  Catheter type: side hole  Catheter size: 19 G  Catheter securement method: surgical tape  Test dose: negativelidocaine (PF) (XYLOCAINE-MPF) 1 % - Infiltration   3 mL - 11/29/2022 1:40:00 PM  bupivacaine (PF) (MARCAINE) 0 25 % - Epidural   5 mL - 11/29/2022 1:42:00 PM  Assessment  Sensory level: T10  Number of attempts: 1negative aspiration for CSF, negative aspiration for heme and no paresthesia on injection  patient tolerated the procedure well with no immediate complications

## 2022-11-29 NOTE — OB LABOR/OXYTOCIN SAFETY PROGRESS
Oxytocin Safety Progress Check Note - Saima Reed 24 y o  female MRN: [id number]    Unit/Bed#: -01 Encounter: [id number]    Dose (avis-units/min) Oxytocin: 6 avis-units/min  Contraction Frequency (minutes): 2-3  Contraction Quality: Moderate  Tachysystole: No   Cervical Dilation: 5        Cervical Effacement: 80  Fetal Station: -2  Baseline Rate: 110 bpm  Fetal Heart Rate: 110 BPM  FHR Category: Category I           Vital Signs:   Vitals:    22 1402   BP: 113/55   Pulse: 62   Resp:    Temp:    SpO2:        Notes/comments:   - Fetal and maternal status reassuring    - Patient now comfortable following placement of epidural    - AROM performed on exam at 15:02  Clear fluid noted  - Continue pitocin titration  Anticipate          Nicolasa Esposito MD 2022 3:10 PM

## 2022-11-29 NOTE — PLAN OF CARE
Problem: PAIN - ADULT  Goal: Verbalizes/displays adequate comfort level or baseline comfort level  Description: Interventions:  - Encourage patient to monitor pain and request assistance  - Assess pain using appropriate pain scale  - Administer analgesics based on type and severity of pain and evaluate response  - Implement non-pharmacological measures as appropriate and evaluate response  - Consider cultural and social influences on pain and pain management  - Notify physician/advanced practitioner if interventions unsuccessful or patient reports new pain  Outcome: Progressing     Problem: INFECTION - ADULT  Goal: Absence or prevention of progression during hospitalization  Description: INTERVENTIONS:  - Assess and monitor for signs and symptoms of infection  - Monitor lab/diagnostic results  - Monitor all insertion sites, i e  indwelling lines, tubes, and drains  - Monitor endotracheal if appropriate and nasal secretions for changes in amount and color  - [place] appropriate cooling/warming therapies per order  - Administer medications as ordered  - Instruct and encourage patient and family to use good hand hygiene technique  - Identify and instruct in appropriate isolation precautions for identified infection/condition  Outcome: Progressing  Goal: Absence of fever/infection during neutropenic period  Description: INTERVENTIONS:  - Monitor WBC    Outcome: Progressing     Problem: SAFETY ADULT  Goal: Patient will remain free of falls  Description: INTERVENTIONS:  - Educate patient/family on patient safety including physical limitations  - Instruct patient to call for assistance with activity   - Consult OT/PT to assist with strengthening/mobility   - Keep Call bell within reach  - Keep bed low and locked with side rails adjusted as appropriate  - Keep care items and personal belongings within reach  - Initiate and maintain comfort rounds  - Make Fall Risk Sign visible to staff  - Apply yellow socks and bracelet for high fall risk patients  - Consider moving patient to room near nurses station  Outcome: Progressing  Goal: Maintain or return to baseline ADL function  Description: INTERVENTIONS:  -  Assess patient's ability to carry out ADLs; assess patient's baseline for ADL function and identify physical deficits which impact ability to perform ADLs (bathing, care of mouth/teeth, toileting, grooming, dressing, etc )  - Assess/evaluate cause of self-care deficits   - Assess range of motion  - Assess patient's mobility; develop plan if impaired  - Assess patient's need for assistive devices and provide as appropriate  - Encourage maximum independence but intervene and supervise when necessary  - Involve family in performance of ADLs  - Assess for home care needs following discharge   - Consider OT consult to assist with ADL evaluation and planning for discharge  - Provide patient education as appropriate  Outcome: Progressing  Goal: Maintains/Returns to pre admission functional level  Description: INTERVENTIONS:  - Perform BMAT or MOVE assessment daily    - Set and communicate daily mobility goal to care team and patient/family/caregiver  - Collaborate with rehabilitation services on mobility goals if consulted  - Out of bed for toileting  - Record patient progress and toleration of activity level   Outcome: Progressing     Problem: Knowledge Deficit  Goal: Patient/family/caregiver demonstrates understanding of disease process, treatment plan, medications, and discharge instructions  Description: Complete learning assessment and assess knowledge base  Interventions:  - Provide teaching at level of understanding  - Provide teaching via preferred learning methods  Outcome: Progressing  Goal: Verbalizes understanding of labor plan  Description: Assess patient/family/caregiver's baseline knowledge level and ability to understand information    Provide education via patient/family/caregiver's preferred learning method at appropriate level of understanding  1  Provide teaching at level of understanding  2  Provide teaching via preferred learning method(s)  Outcome: Progressing     Problem: DISCHARGE PLANNING  Goal: Discharge to home or other facility with appropriate resources  Description: INTERVENTIONS:  - Identify barriers to discharge w/patient and caregiver  - Arrange for needed discharge resources and transportation as appropriate  - Identify discharge learning needs (meds, wound care, etc )  - Arrange for interpretive services to assist at discharge as needed  - Refer to Case Management Department for coordinating discharge planning if the patient needs post-hospital services based on physician/advanced practitioner order or complex needs related to functional status, cognitive ability, or social support system  Outcome: Progressing     Problem: Labor & Delivery  Goal: Manages discomfort  Description: Assess and monitor for signs and symptoms of discomfort  Assess patient's pain level regularly and per hospital policy  Administer medications as ordered  Support use of nonpharmacological methods to help control pain such as distraction, imagery, relaxation, and application of heat and cold  Collaborate with interdisciplinary team and patient to determine appropriate pain management plan  1  Include patient in decisions related to comfort  2  Offer non-pharmacological pain management interventions  3  Report ineffective pain management to physician  Outcome: Progressing  Goal: Patient vital signs are stable  Description: 1  Assess vital signs - vaginal delivery    Outcome: Progressing     Problem: BIRTH - VAGINAL/ SECTION  Goal: Fetal and maternal status remain reassuring during the birth process  Description: INTERVENTIONS:  - Monitor vital signs  - Monitor fetal heart rate  - Monitor uterine activity  - Monitor labor progression (vaginal delivery)  - DVT prophylaxis  - Antibiotic prophylaxis  Outcome: Progressing  Goal: Emotionally satisfying birthing experience for mother/fetus  Description: Interventions:  - Assess, plan, implement and evaluate the nursing care given to the patient in labor  - Advocate the philosophy that each childbirth experience is a unique experience and support the family's chosen level of involvement and control during the labor process   - Actively participate in both the patient's and family's teaching of the birth process  - Consider cultural, Baptism and age-specific factors and plan care for the patient in labor  Outcome: Progressing

## 2022-11-29 NOTE — H&P
OB H&P  Theta Broody  1995  /LD       24 y o  yo  at 44 weeks by us and lmp    Chief complaint:  eIOL    HPI:  Contractions:  no  Fetal movement:  yes  Vaginal bleeding:   no  Leaking of fluid:  no      Pregnancy Complications:  Patient Active Problem List   Diagnosis   • 39 weeks gestation of pregnancy   • Nausea and vomiting of pregnancy, antepartum   • COVID-19 vaccine series declined   • Anemia during pregnancy in third trimester   • COVID-19 affecting pregnancy in third trimester   • 31 weeks gestation of pregnancy   • Abdominal pain in pregnancy, third trimester   • GBS (group B Streptococcus carrier), +RV culture, currently pregnant   • Encounter for elective induction of labor         Past Medical History:  Past Medical History:   Diagnosis Date   • Vaping nicotine dependence, tobacco product 2022     Encouraged stopping in pregnancy  Pt attempting to quit  Past Surgical History:  No past surgical history on file      Social Hx:  Social History     Socioeconomic History   • Marital status: /Civil Union     Spouse name: Not on file   • Number of children: Not on file   • Years of education: Not on file   • Highest education level: Not on file   Occupational History   • Not on file   Tobacco Use   • Smoking status: Never   • Smokeless tobacco: Never   Vaping Use   • Vaping Use: Some days   • Substances: Nicotine, Flavoring   Substance and Sexual Activity   • Alcohol use: Not Currently   • Drug use: Never   • Sexual activity: Yes     Partners: Male     Birth control/protection: None   Other Topics Concern   • Not on file   Social History Narrative   • Not on file     Social Determinants of Health     Financial Resource Strain: Not on file   Food Insecurity: Not on file   Transportation Needs: Not on file   Physical Activity: Not on file   Stress: Not on file   Social Connections: Not on file   Intimate Partner Violence: Not on file   Housing Stability: Not on file Meds:  No current facility-administered medications on file prior to encounter  Current Outpatient Medications on File Prior to Encounter   Medication Sig Dispense Refill   • Ferrous Sulfate ER (Slow Fe) 142 (45 Fe) MG TBCR Take 1 tablet by mouth 2 (two) times a day     • Prenatal Vit-Fe Fumarate-FA (PRENATAL VITAMIN PO) Take by mouth         Allergies: Allergies   Allergen Reactions   • Latex Hives and Shortness Of Breath       Obstetric History:    G 1 uncomplicated pregnancy  GBS+    Labs:  Strep Grp B ALBA   Date Value Ref Range Status   2022 Positive (A) Negative Final     Comment:     Centers for Disease Control and Prevention (CDC) and American Congress  of Obstetricians and Gynecologists (ACOG) guidelines for prevention of   group B streptococcal (GBS) disease specify co-collection of  a vaginal and rectal swab specimen to maximize sensitivity of GBS  detection  Per the CDC and ACOG, swabbing both the lower vagina and  rectum substantially increases the yield of detection compared with  sampling the vagina alone  Penicillin G, ampicillin, or cefazolin are indicated for intrapartum  prophylaxis of  GBS colonization  Reflex susceptibility  testing should be performed prior to use of clindamycin only on GBS  isolates from penicillin-allergic women who are considered a high risk  for anaphylaxis  Treatment with vancomycin without additional testing  is warranted if resistance to clindamycin is noted  Type & Screen:  ABO Grouping   Date Value Ref Range Status   2022 O  Final      Rh Type   Date Value Ref Range Status   2022 Positive  Final     Comment:     Please note: Prior records for this patient's ABO / Rh type are not  available for additional verification        No results found for: ANTIBODYSCR  No results found for: SPECIMEXP  HIV-1/HIV-2 AB   Date Value Ref Range Status   2022 Non-Reactive  Final     Hepatitis B Surface Ag   Date Value Ref Range Status   2022 negative  Final     RPR   Date Value Ref Range Status   2022 Non Reactive Non Reactive Final     No results found for: RUBELLAIGGQT  Glucose   Date Value Ref Range Status   2022 79 65 - 139 mg/dL Final     Comment:     According to ADA, a glucose threshold of >139 mg/dL after 50-gram  load identifies approximately 80% of women with gestational  diabetes mellitus, while the sensitivity is further increased to  approximately 90% by a threshold of >129 mg/dL  Physical Exam:      Vital signs: 121/80  97  16  afgebrile      Heart: RRR  Lungs: clear to ausculation   Abdomen: soft, nontender, gravid,   Estimated Fetal Weight: 7 lbs  Extremeties: min edema, no justine's or zakia's sign  Presentation: vertex  Cervix: 0/0/-3  FHR: cat 1  CTXN: rare  Membranes: intact    Assessment:    at 39 weeks     GBS positive  eIOL unable to pass west      Plan:   cytotec  Ordered abx for active labor

## 2022-11-29 NOTE — OB LABOR/OXYTOCIN SAFETY PROGRESS
Labor Progress Note - Víctor Landaverde 24 y o  female MRN: [id number]    Unit/Bed#: -01 Encounter: [id number]       Contraction Frequency (minutes): 1-4  Contraction Quality: Mild  Tachysystole: No   Cervical Dilation: Closed  Cervical Effacement: 60  Fetal Station: -3  Baseline Rate: 110 bpm  Fetal Heart Rate: 110 BPM  FHR Category: Category I           Vital Signs:   Vitals:    11/29/22 0534   BP: 111/71   Pulse: 59   Resp: 18   Temp: 97 7 °F (36 5 °C)   SpO2: 100%       Notes/comments:   - Fetal and maternal status reassuring    - Cervix remains closed and unfavorable this morning  Salomon balloon inserted without difficulty using speculum and ring forceps  Balloon inflated with 60 mL sterile saline    - Given relatively frequent uterine activity on tocometer, will hold on additional dose of misoprostol at this time  - Will allow patient to eat light breakfast now  Consider initiation of low-dose pitocin in 1-2 hours  - Will initiate penicillin now for routine GBS ppx           Igor Martini MD 11/29/2022 8:16 AM

## 2022-11-29 NOTE — OB LABOR/OXYTOCIN SAFETY PROGRESS
Oxytocin Safety Progress Check Note - Alisia Hyatt 24 y o  female MRN: [id number]    Unit/Bed#: -01 Encounter: [id number]    Dose (avis-units/min) Oxytocin: 8 avis-units/min  Contraction Frequency (minutes): 2-4  Contraction Quality: Strong  Tachysystole: No   Cervical Dilation: 6-7        Cervical Effacement: 90  Fetal Station: -1  Baseline Rate: 115 bpm  Fetal Heart Rate: 120 BPM  FHR Category: Category II           Vital Signs:   Vitals:    11/29/22 1402   BP: 113/55   Pulse: 62   Resp:    Temp:    SpO2:        Notes/comments:   SAFETY HUDDLE:  TRIGGER: Category 2 FHR tracing    PARTICIPANTS: Willam Valente RN; Yonis Baer RN; Brookie Libman, MD    REVIEW OF CURRENT PLAN OF CARE AND MANAGEMENT: Currently, moderate variability and accelerations is/are present  The tracing has been category II due to occasional, non-recurrent variable decelerations  At this time, resuscitative measures are being employed, including maternal repositioning and amnioinfusion  I recommend continued resuscitation and observation because the decelerations are occurring with fewer than 50% of the contractions and normal progress is being made in the active phase of labor  TIMELINE FOR NEXT ASSESSMENT: Ongoing       ALL PARTICIPANTS IN AGREEMENT WITH PLAN OF CARE: Yes    IS PERRT REQUIRED: No     Rosemarie Rushing MD 11/29/2022 5:41 PM

## 2022-11-29 NOTE — OB LABOR/OXYTOCIN SAFETY PROGRESS
Labor Progress Note - Verneice Fail 24 y o  female MRN: [id number]    Unit/Bed#: -01 Encounter: [id number]       Contraction Frequency (minutes): 2-5  Contraction Quality: Mild  Tachysystole: No   Cervical Dilation: 1-2        Cervical Effacement: 70  Fetal Station: -3  Baseline Rate: 115 bpm  Fetal Heart Rate: 110 BPM  FHR Category: Category I           Vital Signs:   Vitals:    11/29/22 0859   BP: 120/74   Pulse: 77   Resp: 16   Temp: (!) 97 4 °F (36 3 °C)   SpO2:        Notes/comments:   - Fetal and maternal status reassuring    - Salomon balloon remains firmly in place   - Will initiate low-dose pitocin now  - Continue penicillin, per protocol    - Analgesia PRN          Zulema Renee MD 11/29/2022 10:22 AM

## 2022-11-30 ENCOUNTER — APPOINTMENT (INPATIENT)
Dept: NON INVASIVE DIAGNOSTICS | Facility: HOSPITAL | Age: 27
End: 2022-11-30

## 2022-11-30 LAB
ERYTHROCYTE [DISTWIDTH] IN BLOOD BY AUTOMATED COUNT: 13 % (ref 11.6–15.1)
HCT VFR BLD AUTO: 27.9 % (ref 34.8–46.1)
HGB BLD-MCNC: 9.5 G/DL (ref 11.5–15.4)
MCH RBC QN AUTO: 32.4 PG (ref 26.8–34.3)
MCHC RBC AUTO-ENTMCNC: 34.1 G/DL (ref 31.4–37.4)
MCV RBC AUTO: 95 FL (ref 82–98)
PLATELET # BLD AUTO: 182 THOUSANDS/UL (ref 149–390)
PMV BLD AUTO: 11.4 FL (ref 8.9–12.7)
RBC # BLD AUTO: 2.93 MILLION/UL (ref 3.81–5.12)
WBC # BLD AUTO: 17.23 THOUSAND/UL (ref 4.31–10.16)

## 2022-11-30 RX ADMIN — IBUPROFEN 600 MG: 600 TABLET, FILM COATED ORAL at 00:29

## 2022-11-30 RX ADMIN — DOCUSATE SODIUM 100 MG: 100 CAPSULE, LIQUID FILLED ORAL at 11:00

## 2022-11-30 RX ADMIN — ACETAMINOPHEN 650 MG: 325 TABLET, FILM COATED ORAL at 02:59

## 2022-11-30 RX ADMIN — ACETAMINOPHEN 650 MG: 325 TABLET, FILM COATED ORAL at 11:01

## 2022-11-30 RX ADMIN — IBUPROFEN 600 MG: 600 TABLET, FILM COATED ORAL at 14:13

## 2022-11-30 RX ADMIN — IBUPROFEN 600 MG: 600 TABLET, FILM COATED ORAL at 07:57

## 2022-11-30 RX ADMIN — DOCUSATE SODIUM 100 MG: 100 CAPSULE, LIQUID FILLED ORAL at 18:02

## 2022-11-30 RX ADMIN — ACETAMINOPHEN 650 MG: 325 TABLET, FILM COATED ORAL at 18:02

## 2022-11-30 RX ADMIN — IBUPROFEN 600 MG: 600 TABLET, FILM COATED ORAL at 22:06

## 2022-11-30 NOTE — PROGRESS NOTES
Progress Note - OB/GYN  Post-Partum Physician Note   Manual Vishal 24 y o  female MRN: [id number]  Unit/Bed#:  208-01 Encounter: [id number]    Patient is postpartum day 1 from a Vaginal, Spontaneous  with a 2nd degree laceration and Anesthesia: epidural    Subjective:   Pain: controlled  Tolerating Oral Intake: yes  Voiding: yes  Flatus: no  Bowel Movement: no  Ambulating: yes  Breastfeeding: Breastfeeding  Shortness of Breath: no  Leg Pain/Discomfort: no  Lochia: normal      Objective:   Vitals:    11/29/22 2300 11/29/22 2315 11/29/22 2345 11/30/22 0700   BP: 119/73 116/78 128/84 131/85   BP Location:  Left arm Left arm Left arm   Pulse: 74 68 83 87   Resp:  18 18 16   Temp:  98 °F (36 7 °C) 98 5 °F (36 9 °C) 98 1 °F (36 7 °C)   TempSrc:  Temporal Temporal Temporal   SpO2:  100% 100% 98%   Weight:       Height:           Intake/Output Summary (Last 24 hours) at 11/30/2022 0849  Last data filed at 11/30/2022 0501  Gross per 24 hour   Intake 2542 05 ml   Output 2613 ml   Net -70 95 ml       Physical Exam:  General: in no apparent distress  Abdomen: abdomen is soft without significant tenderness  Fundus: Firm, 1 below the umbilicus  Perineum: exam deferred  Lower extremities: nontender      Labs/Tests:   Lab Results   Component Value Date    WBC 17 23 (H) 11/30/2022    HGB 9 5 (L) 11/30/2022    HCT 27 9 (L) 11/30/2022    MCV 95 11/30/2022     11/30/2022       Brief OB Lab review:  ABO Grouping   Date Value Ref Range Status   11/28/2022 O  Final      Rh Factor   Date Value Ref Range Status   11/28/2022 Positive  Final     Rh Type   Date Value Ref Range Status   05/03/2022 Positive  Final     Comment:     Please note: Prior records for this patient's ABO / Rh type are not  available for additional verification        No results found for: ANTIBODYSCR  No results found for: RUBM    MEDS:   Current Facility-Administered Medications   Medication Dose Route Frequency   • acetaminophen (TYLENOL) tablet 650 mg 650 mg Oral Q4H PRN   • benzocaine-menthol-lanolin-aloe (DERMOPLAST) 20-0 5 % topical spray 1 application  1 application Topical U0S PRN   • calcium carbonate (TUMS) chewable tablet 1,000 mg  1,000 mg Oral Daily PRN   • diphenhydrAMINE (BENADRYL) tablet 25 mg  25 mg Oral Q6H PRN   • docusate sodium (COLACE) capsule 100 mg  100 mg Oral BID   • hydrocortisone 1 % cream 1 application  1 application Topical Daily PRN   • ibuprofen (MOTRIN) tablet 600 mg  600 mg Oral Q6H PRN   • ondansetron (ZOFRAN) injection 4 mg  4 mg Intravenous Q8H PRN   • witch hazel-glycerin (TUCKS) topical pad 1 pad  1 pad Topical Q4H PRN     Invasive Devices     Peripheral Intravenous Line  Duration           Peripheral IV 22 Distal;Right;Ventral (anterior) Forearm 1 day                Assessment and Plan:  24y o  year-old , postpartum day 1 status-post  Vaginal, Spontaneous  and 2nd degree laceration  Continue routine postpartum care  Encourage ambulation    Planning discharge tomorrow    Postpartum anemia  - post delivery hgb 9 5g/dl, asx   - taking po iron during pregnancy  Will restart once has JORDY Hopkins MD

## 2022-11-30 NOTE — OB LABOR/OXYTOCIN SAFETY PROGRESS
Oxytocin Safety Progress Check Note - Lou Gr 24 y o  female MRN: [id number]    Unit/Bed#: -01 Encounter: [id number]    Dose (avis-units/min) Oxytocin: 8 avis-units/min  Contraction Frequency (minutes): 2 5-3  Contraction Quality: Strong  Tachysystole: No   Cervical Dilation: 9        Cervical Effacement: 90  Fetal Station: 0  Baseline Rate: 110 bpm  Fetal Heart Rate: 120 BPM  FHR Category: Category II           Vital Signs:   Vitals:    11/29/22 1928   BP: 126/78   Pulse: 70   Resp:    Temp: 97 7 °F (36 5 °C)   SpO2: 100%       Notes/comments:   SAFETY HUDDLE:  TRIGGER: Category 2 FHR tracing    PARTICIPANTS: Saurabh West RN; Oralia Lind MD    REVIEW OF CURRENT PLAN OF CARE AND MANAGEMENT: Currently, moderate variability and accelerations is/are present  The tracing has been category II due to occasional, non-repetitive late decelerations  At this time, resuscitative measures are being employed, including maternal repositioning  I recommend continued resuscitation and observation because the decelerations are occurring with fewer than 50% of the contractions and normal progress is being made in the active phase of labor       TIMELINE FOR NEXT ASSESSMENT: 30-60 minutes    ALL PARTICIPANTS IN AGREEMENT WITH PLAN OF CARE: Yes    IS PERRT REQUIRED: No     Tanisha Marie MD 11/29/2022 7:42 PM

## 2022-11-30 NOTE — L&D DELIVERY NOTE
DELIVERY NOTE  Moriah Wyatt 24 y o  female MRN: [id number]  Unit/Bed#: -01 Encounter: [id number]    Obstetrician:  Katelynn Milner MD    Pre-Delivery Diagnosis: [place] pregnancy in vertex presentation at 39w4d gestation  Post-Delivery Diagnosis: Same, delivered    Procedure: Spontaneous vaginal delivery    Delivery Date and Time:  2022 at 71:17    Umbilical Artery  Recent Labs     22   PHCART 7 157*   BECART -8 7*       Umbilical Vein  Recent Labs     22   PHCVEN 7 234   BECVEN -8 2*       Apgars: 6 at 1 minute, 9 at 5 minutes    Weight: Pending           Complications: None    Description of Procedure:  Patient was found to be completely dilated and +1 station  She pushed for 36 minutes to spontaneously deliver a liveborn infant in direct occiput anterior position through clear fluid at 21:00  A nuchal cord x 1 was noted and reduced  The head and shoulders delivered easily, with the body easily delivering thereafter  The infant was placed on maternal abdomen  Delayed cord clamping was deferred at the request of the  resuscitation team  Routine cord gases and cord blood were collected  Pitocin was started for active management of the 3rd stage  Placenta delivered spontaneously and was noted to have a centrally-inserted 3-vessel cord  The placenta was sent for storage  The fundus was noted to be firm  A thorough pelvic exam revealed a 2nd degree laceration, which was repaired with 2-0 and 3-0 polyglactin suture in typical fashion  Excellent hemostasis was noted, with total QBL of 388 mL  All needle, sponge, and instrument counts were noted to be correct  The patient tolerated the procedure well and was allowed to recover in labor and delivery room       Tristian Esposito MD  2022 9:41 PM

## 2022-11-30 NOTE — LACTATION NOTE
This note was copied from a baby's chart  Met with parents to discuss feeding plan  Mother is planning on breastfeeding and has been doing well  The Ready, Set, Baby Booklet was provided and Breastfeeding Discharge booklet as well  Visitors present  Parents were made aware of how to communicate with lactation for a latch assessment and encouraged to reach out for continued support and/or questions that arise

## 2022-11-30 NOTE — OB LABOR/OXYTOCIN SAFETY PROGRESS
Oxytocin Safety Progress Check Note - Alecia Cruz 24 y o  female MRN: [id number]    Unit/Bed#: -01 Encounter: [id number]    Dose (avis-units/min) Oxytocin: 8 avis-units/min  Contraction Frequency (minutes): 2-3 5  Contraction Quality: Strong  Tachysystole: No   Cervical Dilation: 10  Dilation Complete Date: 22  Dilation Complete Time:   Cervical Effacement: 100  Fetal Station: 2  Baseline Rate: 110 bpm  Fetal Heart Rate: 112 BPM  FHR Category: Category II           Vital Signs:   Vitals:    22   BP:    Pulse:    Resp:    Temp: 98 3 °F (36 8 °C)   SpO2:        Notes/comments:   - Fully dilated and pushing very well  FHR is category II due to low baseline and variable decelerations with pushing  Overall reassuring based on moderate variability and present accelerations  Has made descent from +1 to +2-3 station over 15 minutes of pushing  Will continue  Anticipate  shortly           Jocelyn Montero MD 2022 8:40 PM

## 2022-11-30 NOTE — ANESTHESIA POSTPROCEDURE EVALUATION
Post-Op Assessment Note    CV Status:  Stable    Pain management: adequate     Mental Status:  Alert and awake   Hydration Status:  Euvolemic and stable   PONV Controlled:  None   Airway Patency:  Patent      Post Op Vitals Reviewed: Yes      Staff: Anesthesiologist     Post-op block assessment: site cleaned and catheter intact      No notable events documented      BP      Temp      Pulse     Resp      SpO2

## 2022-11-30 NOTE — PLAN OF CARE
Problem: PAIN - ADULT  Goal: Verbalizes/displays adequate comfort level or baseline comfort level  Description: Interventions:  - Encourage patient to monitor pain and request assistance  - Assess pain using appropriate pain scale  - Administer analgesics based on type and severity of pain and evaluate response  - Implement non-pharmacological measures as appropriate and evaluate response  - Consider cultural and social influences on pain and pain management  - Notify physician/advanced practitioner if interventions unsuccessful or patient reports new pain  Outcome: Progressing     Problem: INFECTION - ADULT  Goal: Absence or prevention of progression during hospitalization  Description: INTERVENTIONS:  - Assess and monitor for signs and symptoms of infection  - Monitor lab/diagnostic results  - Monitor all insertion sites, i e  indwelling lines, tubes, and drains  - Monitor endotracheal if appropriate and nasal secretions for changes in amount and color  - [place] appropriate cooling/warming therapies per order  - Administer medications as ordered  - Instruct and encourage patient and family to use good hand hygiene technique  - Identify and instruct in appropriate isolation precautions for identified infection/condition  Outcome: Progressing  Goal: Absence of fever/infection during neutropenic period  Description: INTERVENTIONS:  - Monitor WBC    Outcome: Progressing     Problem: SAFETY ADULT  Goal: Patient will remain free of falls  Description: INTERVENTIONS:  - Educate patient/family on patient safety including physical limitations  - Instruct patient to call for assistance with activity   - Consult OT/PT to assist with strengthening/mobility   - Keep Call bell within reach  - Keep bed low and locked with side rails adjusted as appropriate  - Keep care items and personal belongings within reach  - Initiate and maintain comfort rounds  - Make Fall Risk Sign visible to staff  - Apply yellow socks and bracelet for high fall risk patients  - Consider moving patient to room near nurses station  Outcome: Progressing  Goal: Maintain or return to baseline ADL function  Description: INTERVENTIONS:  -  Assess patient's ability to carry out ADLs; assess patient's baseline for ADL function and identify physical deficits which impact ability to perform ADLs (bathing, care of mouth/teeth, toileting, grooming, dressing, etc )  - Assess/evaluate cause of self-care deficits   - Assess range of motion  - Assess patient's mobility; develop plan if impaired  - Assess patient's need for assistive devices and provide as appropriate  - Encourage maximum independence but intervene and supervise when necessary  - Involve family in performance of ADLs  - Assess for home care needs following discharge   - Consider OT consult to assist with ADL evaluation and planning for discharge  - Provide patient education as appropriate  Outcome: Progressing  Goal: Maintains/Returns to pre admission functional level  Description: INTERVENTIONS:  - Perform BMAT or MOVE assessment daily    - Set and communicate daily mobility goal to care team and patient/family/caregiver  - Collaborate with rehabilitation services on mobility goals if consulted  - Out of bed for toileting  - Record patient progress and toleration of activity level   Outcome: Progressing     Problem: Knowledge Deficit  Goal: Patient/family/caregiver demonstrates understanding of disease process, treatment plan, medications, and discharge instructions  Description: Complete learning assessment and assess knowledge base  Interventions:  - Provide teaching at level of understanding  - Provide teaching via preferred learning methods  Outcome: Progressing  Goal: Verbalizes understanding of labor plan  Description: Assess patient/family/caregiver's baseline knowledge level and ability to understand information    Provide education via patient/family/caregiver's preferred learning method at appropriate level of understanding  1  Provide teaching at level of understanding  2  Provide teaching via preferred learning method(s)  Outcome: Progressing     Problem: DISCHARGE PLANNING  Goal: Discharge to home or other facility with appropriate resources  Description: INTERVENTIONS:  - Identify barriers to discharge w/patient and caregiver  - Arrange for needed discharge resources and transportation as appropriate  - Identify discharge learning needs (meds, wound care, etc )  - Arrange for interpretive services to assist at discharge as needed  - Refer to Case Management Department for coordinating discharge planning if the patient needs post-hospital services based on physician/advanced practitioner order or complex needs related to functional status, cognitive ability, or social support system  Outcome: Progressing     Problem: Labor & Delivery  Goal: Manages discomfort  Description: Assess and monitor for signs and symptoms of discomfort  Assess patient's pain level regularly and per hospital policy  Administer medications as ordered  Support use of nonpharmacological methods to help control pain such as distraction, imagery, relaxation, and application of heat and cold  Collaborate with interdisciplinary team and patient to determine appropriate pain management plan  1  Include patient in decisions related to comfort  2  Offer non-pharmacological pain management interventions  3  Report ineffective pain management to physician  Outcome: Progressing  Goal: Patient vital signs are stable  Description: 1  Assess vital signs - vaginal delivery    Outcome: Progressing     Problem: BIRTH - VAGINAL/ SECTION  Goal: Fetal and maternal status remain reassuring during the birth process  Description: INTERVENTIONS:  - Monitor vital signs  - Monitor fetal heart rate  - Monitor uterine activity  - Monitor labor progression (vaginal delivery)  - DVT prophylaxis  - Antibiotic prophylaxis  Outcome: Progressing  Goal: Emotionally satisfying birthing experience for mother/fetus  Description: Interventions:  - Assess, plan, implement and evaluate the nursing care given to the patient in labor  - Advocate the philosophy that each childbirth experience is a unique experience and support the family's chosen level of involvement and control during the labor process   - Actively participate in both the patient's and family's teaching of the birth process  - Consider cultural, [place] and age-specific factors and plan care for the patient in labor  Outcome: Progressing     Problem: Potential for Falls  Goal: Patient will remain free of falls  Description: INTERVENTIONS:  - Educate patient/family on patient safety including physical limitations  - Instruct patient to call for assistance with activity   - Consult OT/PT to assist with strengthening/mobility   - Keep Call bell within reach  - Keep bed low and locked with side rails adjusted as appropriate  - Keep care items and personal belongings within reach  - Initiate and maintain comfort rounds  - Apply yellow socks and bracelet for high fall risk patients  - Consider moving patient to room near nurses station  Outcome: Progressing

## 2022-12-01 VITALS
SYSTOLIC BLOOD PRESSURE: 117 MMHG | HEART RATE: 72 BPM | OXYGEN SATURATION: 99 % | HEIGHT: 62 IN | RESPIRATION RATE: 16 BRPM | BODY MASS INDEX: 25.95 KG/M2 | WEIGHT: 141 LBS | TEMPERATURE: 97.8 F | DIASTOLIC BLOOD PRESSURE: 75 MMHG

## 2022-12-01 RX ORDER — OXYCODONE HYDROCHLORIDE 5 MG/1
5 TABLET ORAL EVERY 6 HOURS PRN
Qty: 10 TABLET | Refills: 0 | Status: SHIPPED | OUTPATIENT
Start: 2022-12-01 | End: 2022-12-11

## 2022-12-01 RX ORDER — OXYCODONE HYDROCHLORIDE 5 MG/1
5 TABLET ORAL EVERY 6 HOURS PRN
Status: DISCONTINUED | OUTPATIENT
Start: 2022-12-01 | End: 2022-12-01 | Stop reason: HOSPADM

## 2022-12-01 RX ORDER — ACETAMINOPHEN 325 MG/1
650 TABLET ORAL EVERY 6 HOURS PRN
Refills: 0
Start: 2022-12-01

## 2022-12-01 RX ORDER — IBUPROFEN 600 MG/1
600 TABLET ORAL EVERY 6 HOURS PRN
Refills: 0
Start: 2022-12-01

## 2022-12-01 RX ADMIN — DOCUSATE SODIUM 100 MG: 100 CAPSULE, LIQUID FILLED ORAL at 18:12

## 2022-12-01 RX ADMIN — DOCUSATE SODIUM 100 MG: 100 CAPSULE, LIQUID FILLED ORAL at 08:03

## 2022-12-01 RX ADMIN — ACETAMINOPHEN 650 MG: 325 TABLET, FILM COATED ORAL at 08:03

## 2022-12-01 RX ADMIN — ACETAMINOPHEN 650 MG: 325 TABLET, FILM COATED ORAL at 18:12

## 2022-12-01 RX ADMIN — IBUPROFEN 600 MG: 600 TABLET, FILM COATED ORAL at 05:07

## 2022-12-01 RX ADMIN — IBUPROFEN 600 MG: 600 TABLET, FILM COATED ORAL at 14:15

## 2022-12-01 NOTE — PLAN OF CARE
Problem: PAIN - ADULT  Goal: Verbalizes/displays adequate comfort level or baseline comfort level  Description: Interventions:  - Encourage patient to monitor pain and request assistance  - Assess pain using appropriate pain scale  - Administer analgesics based on type and severity of pain and evaluate response  - Implement non-pharmacological measures as appropriate and evaluate response  - Consider cultural and social influences on pain and pain management  - Notify physician/advanced practitioner if interventions unsuccessful or patient reports new pain  Outcome: Progressing     Problem: INFECTION - ADULT  Goal: Absence or prevention of progression during hospitalization  Description: INTERVENTIONS:  - Assess and monitor for signs and symptoms of infection  - Monitor lab/diagnostic results  - Monitor all insertion sites, i e  indwelling lines, tubes, and drains  - Monitor endotracheal if appropriate and nasal secretions for changes in amount and color  - [place] appropriate cooling/warming therapies per order  - Administer medications as ordered  - Instruct and encourage patient and family to use good hand hygiene technique  - Identify and instruct in appropriate isolation precautions for identified infection/condition  Outcome: Progressing  Goal: Absence of fever/infection during neutropenic period  Description: INTERVENTIONS:  - Monitor WBC    Outcome: Progressing     Problem: SAFETY ADULT  Goal: Patient will remain free of falls  Description: INTERVENTIONS:  - Educate patient/family on patient safety including physical limitations  - Instruct patient to call for assistance with activity   - Consult OT/PT to assist with strengthening/mobility   - Keep Call bell within reach  - Keep bed low and locked with side rails adjusted as appropriate  - Keep care items and personal belongings within reach  - Initiate and maintain comfort rounds  - Make Fall Risk Sign visible to staff  - Offer Toileting every  Hours, in advance of need  - Initiate/Maintain alarm  - Obtain necessary fall risk management equipment:   - Apply yellow socks and bracelet for high fall risk patients  - Consider moving patient to room near nurses station  Outcome: Progressing  Goal: Maintain or return to baseline ADL function  Description: INTERVENTIONS:  -  Assess patient's ability to carry out ADLs; assess patient's baseline for ADL function and identify physical deficits which impact ability to perform ADLs (bathing, care of mouth/teeth, toileting, grooming, dressing, etc )  - Assess/evaluate cause of self-care deficits   - Assess range of motion  - Assess patient's mobility; develop plan if impaired  - Assess patient's need for assistive devices and provide as appropriate  - Encourage maximum independence but intervene and supervise when necessary  - Involve family in performance of ADLs  - Assess for home care needs following discharge   - Consider OT consult to assist with ADL evaluation and planning for discharge  - Provide patient education as appropriate  Outcome: Progressing  Goal: Maintains/Returns to pre admission functional level  Description: INTERVENTIONS:  - Perform BMAT or MOVE assessment daily    - Set and communicate daily mobility goal to care team and patient/family/caregiver  - Collaborate with rehabilitation services on mobility goals if consulted  - Perform Range of Motion  times a day  - Reposition patient every  hours    - Dangle patient  times a day  - Stand patient  times a day  - Ambulate patient  times a day  - Out of bed to chair  times a day   - Out of bed for meals  times a day  - Out of bed for toileting  - Record patient progress and toleration of activity level   Outcome: Progressing     Problem: Knowledge Deficit  Goal: Patient/family/caregiver demonstrates understanding of disease process, treatment plan, medications, and discharge instructions  Description: Complete learning assessment and assess knowledge base   Interventions:  - Provide teaching at level of understanding  - Provide teaching via preferred learning methods  Outcome: Progressing  Goal: Verbalizes understanding of labor plan  Description: Assess patient/family/caregiver's baseline knowledge level and ability to understand information  Provide education via patient/family/caregiver's preferred learning method at appropriate level of understanding  1  Provide teaching at level of understanding  2  Provide teaching via preferred learning method(s)  Outcome: Progressing     Problem: DISCHARGE PLANNING  Goal: Discharge to home or other facility with appropriate resources  Description: INTERVENTIONS:  - Identify barriers to discharge w/patient and caregiver  - Arrange for needed discharge resources and transportation as appropriate  - Identify discharge learning needs (meds, wound care, etc )  - Arrange for interpretive services to assist at discharge as needed  - Refer to Case Management Department for coordinating discharge planning if the patient needs post-hospital services based on physician/advanced practitioner order or complex needs related to functional status, cognitive ability, or social support system  Outcome: Progressing     Problem: Labor & Delivery  Goal: Manages discomfort  Description: Assess and monitor for signs and symptoms of discomfort  Assess patient's pain level regularly and per hospital policy  Administer medications as ordered  Support use of nonpharmacological methods to help control pain such as distraction, imagery, relaxation, and application of heat and cold  Collaborate with interdisciplinary team and patient to determine appropriate pain management plan  1  Include patient in decisions related to comfort  2  Offer non-pharmacological pain management interventions  3  Report ineffective pain management to physician  Outcome: Progressing  Goal: Patient vital signs are stable  Description: 1   Assess vital signs - vaginal delivery    Outcome: Progressing     Problem: BIRTH - VAGINAL/ SECTION  Goal: Fetal and maternal status remain reassuring during the birth process  Description: INTERVENTIONS:  - Monitor vital signs  - Monitor fetal heart rate  - Monitor uterine activity  - Monitor labor progression (vaginal delivery)  - DVT prophylaxis  - Antibiotic prophylaxis  Outcome: Progressing  Goal: Emotionally satisfying birthing experience for mother/fetus  Description: Interventions:  - Assess, plan, implement and evaluate the nursing care given to the patient in labor  - Advocate the philosophy that each childbirth experience is a unique experience and support the family's chosen level of involvement and control during the labor process   - Actively participate in both the patient's and family's teaching of the birth process  - Consider cultural, [place] and age-specific factors and plan care for the patient in labor  Outcome: Progressing     Problem: Potential for Falls  Goal: Patient will remain free of falls  Description: INTERVENTIONS:  - Educate patient/family on patient safety including physical limitations  - Instruct patient to call for assistance with activity   - Consult OT/PT to assist with strengthening/mobility   - Keep Call bell within reach  - Keep bed low and locked with side rails adjusted as appropriate  - Keep care items and personal belongings within reach  - Initiate and maintain comfort rounds  - Make Fall Risk Sign visible to staff  - Offer Toileting every  Hours, in advance of need  - Initiate/Maintain alarm  - Obtain necessary fall risk management equipment:   - Apply yellow socks and bracelet for high fall risk patients  - Consider moving patient to room near nurses station  Outcome: Progressing

## 2022-12-01 NOTE — LACTATION NOTE
This note was copied from a baby's chart  Met with mother to follow up and to discuss the Breastfeeding Discharge Booklet  Baby is currently at a 5 0% weight loss, having wet and dirty diapers and 24 hour bilirubin and repeats have been in the low intermediate trending downward  Mother discussed that she is breastfeeding well, but has noticed that right side produces less that the left side  Discussed phenomenon and encouraged mother to begin on side baby prefers than switch to the less preferred side (switch nursing)  Showed the feeding log to could be continued using once home for up to the week and discussed the importance of ensuring that baby feeds 8-12x in 24 hours and that baby has 6-8 wet diapers as well as 3-4 soiled diapers (looking for stool transition from meconium to a yellow/gold seedy loose stool)  Mother given resources to look up medications to ensure they are safe with breastfeeding, by communicating with the [facility], [address] as well as using [employer] (assisted mother to pin to home screen on personal phone)    Discussed engorgement time frame (when mature milk comes in) and management as well as how to deal with conditions that may occur while breastfeeding (plugged ducts, milk blebs and mastitis) and when is appropriate to communicate with her OB/GYN and/or a lactation consultant  Discussed how to set up a pump, how to cycle (stimulation vs expression phases during a pumping session), flange fit, milk storage and cleaning  Mother shown handouts for tips on pumping when returning to work and paced bottle feeding that was discussed and demonstrated  Mother shown community resources for continued support in breastfeeding once discharged home  She was encouraged to communicate with [facility], [facility] for lactation home visits and/or with her baby's pediatrician for lactation support/services that could be offered in the practice      Parents were encouraged to call for further questions that arise prior to discharge

## 2022-12-01 NOTE — DISCHARGE SUMMARY
Discharge Summary - OB/GYN   Tamanna Guzman 24 y o  female MRN: [id number]  Unit/Bed#: -01 Encounter: [id number]    Admission Date: 2022     Discharge Date: 22    Principal Diagnosis:   Pregnancy at 39w4d    Secondary Diagnosis: 2nd degree laceration    Attending - Delivery:  Chu Grande MD         - Discharge: Kodi Jha MD    Procedures: Vaginal, Spontaneous , 2nd degree laceration repair    Anesthesia: epidural    Complications: none apparent    Hospital Course:      Tamanna Guzman is a 24 y o  [place] at 39w4d who was admitted for elective induction of labor  She delivered a viable male  with weight of 7lbs, 2 3oz, apgars of 6 (1 min) and 9 (5 min)   was transferred to  nursery  Patient tolerated the procedure well and was transferred to recovery in stable condition  Her postpartum course was uncomplicated  Admission hemoglobin was 12 1g/dl, postpartum was 9 5g/dl  On day of discharge, she was ambulating and able to reasonably perform all ADLs  She was voiding and had appropriate bowel function  Pain was well controlled  She was discharged home on postpartum day #2 without complications  Patient was instructed to follow up with her OB as an outpatient and was given appropriate warnings to call provider if she develops signs of infection or uncontrolled pain  Condition at discharge: good     Discharge instructions/Information to patient and family:   See after visit summary for information provided to patient and family  Provisions for Follow-Up Care:  See after visit summary for information related to follow-up care and any pertinent home health orders  Disposition: Home    Planned Readmission: No    Discharge Medications: For a complete list of the patient's medications, please refer to her med rec      Kodi Jha MD

## 2022-12-01 NOTE — PROGRESS NOTES
Progress Note - OB/GYN  Post-Partum Physician Note   Alecia Cruz 24 y o  female MRN: [id number]  Unit/Bed#:  208-01 Encounter: [id number]    Patient is postpartum day 2 from a Vaginal, Spontaneous  with a 2nd degree laceration and Anesthesia: epidural    Subjective:   Pain: controlled mostly, some pain at perineal stitches when moving around  Tolerating Oral Intake: yes  Voiding: yes  Flatus: yes  Bowel Movement: no  Ambulating: yes  Breastfeeding: Breastfeeding  Shortness of Breath: no  Leg Pain/Discomfort: yesterday left leg pain - LE Doppler normal, pain improved per pt  Lochia: normal      Objective:   Vitals:    11/30/22 1454 11/30/22 1900 11/30/22 2313 12/01/22 0748   BP: 116/89 117/81 122/73 117/75   BP Location: Right arm Right arm Right arm Right arm   Pulse: 96 75 84 72   Resp: 16 16 16 16   Temp: 97 7 °F (36 5 °C) 97 6 °F (36 4 °C) 97 5 °F (36 4 °C) 97 8 °F (36 6 °C)   TempSrc: Temporal Temporal Temporal Temporal   SpO2: 99% 98% 99% 99%   Weight:       Height:         No intake or output data in the 24 hours ending 12/01/22 3075    Physical Exam:  General: in no apparent distress  Abdomen: abdomen is soft without significant tenderness  Fundus: Firm, 1 below the umbilicus  Perineum: exam deferred  Lower extremities: nontender      Labs/Tests:   Lab Results   Component Value Date    WBC 17 23 (H) 11/30/2022    HGB 9 5 (L) 11/30/2022    HCT 27 9 (L) 11/30/2022    MCV 95 11/30/2022     11/30/2022       Brief OB Lab review:  ABO Grouping   Date Value Ref Range Status   11/28/2022 O  Final      Rh Factor   Date Value Ref Range Status   11/28/2022 Positive  Final     Rh Type   Date Value Ref Range Status   05/03/2022 Positive  Final     Comment:     Please note: Prior records for this patient's ABO / Rh type are not  available for additional verification        No results found for: ANTIBODYSCR  No results found for: RUBM    MEDS:   Current Facility-Administered Medications   Medication Dose Route Frequency   • acetaminophen (TYLENOL) tablet 650 mg  650 mg Oral Q4H PRN   • benzocaine-menthol-lanolin-aloe (DERMOPLAST) 20-0 5 % topical spray 1 application  1 application Topical C4S PRN   • calcium carbonate (TUMS) chewable tablet 1,000 mg  1,000 mg Oral Daily PRN   • diphenhydrAMINE (BENADRYL) tablet 25 mg  25 mg Oral Q6H PRN   • docusate sodium (COLACE) capsule 100 mg  100 mg Oral BID   • hydrocortisone 1 % cream 1 application  1 application Topical Daily PRN   • ibuprofen (MOTRIN) tablet 600 mg  600 mg Oral Q6H PRN   • ondansetron (ZOFRAN) injection 4 mg  4 mg Intravenous Q8H PRN   • oxyCODONE (ROXICODONE) IR tablet 5 mg  5 mg Oral Q6H PRN   • witch hazel-glycerin (TUCKS) topical pad 1 pad  1 pad Topical Q4H PRN     Invasive Devices     None                  Assessment and Plan:  24y o  year-old , postpartum day 2 status-post  Vaginal, Spontaneous  and 2nd degree laceration  Continue routine postpartum care  Encourage ambulation  Still some discomfort with moving around, reassured this is normal with laceration repair as it heals  Offered oxycodone PO if needs additional pain control  Denies difficulty walking once up and out of chair or bed  Planning discharge today    Postpartum anemia  - post delivery hgb 9 5g/dl, asx   - taking po iron during pregnancy  Will restart once has JORDY Craft MD

## 2022-12-08 LAB — PLACENTA IN STORAGE: NORMAL

## 2023-01-09 ENCOUNTER — POSTPARTUM VISIT (OUTPATIENT)
Dept: OBGYN CLINIC | Facility: CLINIC | Age: 28
End: 2023-01-09

## 2023-01-09 VITALS
SYSTOLIC BLOOD PRESSURE: 120 MMHG | DIASTOLIC BLOOD PRESSURE: 60 MMHG | BODY MASS INDEX: 22.18 KG/M2 | HEIGHT: 63 IN | WEIGHT: 125.2 LBS

## 2023-01-09 DIAGNOSIS — Z30.011 OCP (ORAL CONTRACEPTIVE PILLS) INITIATION: ICD-10-CM

## 2023-01-09 RX ORDER — ACETAMINOPHEN AND CODEINE PHOSPHATE 120; 12 MG/5ML; MG/5ML
1 SOLUTION ORAL DAILY
Qty: 84 TABLET | Refills: 1 | Status: SHIPPED | OUTPATIENT
Start: 2023-01-09

## 2023-01-09 NOTE — PROGRESS NOTES
[id number], [address] [facility]    Assessment/Plan:  Darcie Haile is a 24y o  year old  who presents for postpartum visit  Routine Postpartum Care  1  Normal postpartum exam  2  Contraception: Desires to start POP  Rx provided today  3  Depression Screen: Medium risk; discussed with patient  She feels that she is in a good place  Mood improved from prior  Denies SI/HI  4  Feeding: Breast  5  Cervical cancer screening Up to Date, next due 2025  6  Follow up in: 3 months for annual exam or as needed  Additional Problems:  1  Postpartum examination following vaginal delivery    2  OCP (oral contraceptive pills) initiation  -     norethindrone (Laila) 0 35 MG tablet; Take 1 tablet (0 35 mg total) by mouth daily        Subjective:     CC: Postpartum visit    Vicki Chino is a 24 y o  y o  female  who presents for a postpartum visit  She is 6 weeks postpartum following  on 2022 at 39 weeks  Postpartum course has been uncomplicated  Bleeding staining only  Bowel function is normal  Bladder function is normal  Patient is not sexually active       Postpartum Depression: Medium Risk   • Last EPDS Total Score: 6   • Last EPDS Self Harm Result: Never       The following portions of the patient's history were reviewed and updated as appropriate: allergies, current medications, past family history, past medical history, obstetric history, gynecologic history, past social history, past surgical history and problem list     Objective:  /60 (BP Location: Left arm, Patient Position: Sitting, Cuff Size: Standard)   Ht 5' 2 5" (1 588 m)   Wt 56 8 kg (125 lb 3 2 oz)   LMP 2022 (Exact Date)   Breastfeeding Yes   BMI 22 53 kg/m²   Pregravid Weight/BMI: 49 kg (108 lb) (BMI 19 75)  Current Weight: 56 8 kg (125 lb 3 2 oz)   Total Weight Gain: 15 kg (33 lb)   Pre- Vitals    Flowsheet Row Most Recent Value   Prenatal Assessment    Prenatal Vitals    Blood Pressure 120/60   Weight - Scale 56 8 kg (125 lb 3 2 oz)   Urine Albumin/Glucose    Dilation/Effacement/Station    Vaginal Drainage    Edema            Chaperone present? Yes: Mickey Cartwright MA  General Appearance: alert and oriented, in no acute distress  Abdomen: Soft, non-tender, non-distended, no masses, no rebound or guarding  Pelvic:       External genitalia: Normal appearance, no abnormal pigmentation, no lesions or masses  Normal Bartholin's and [place]'s  Obstetric laceration well-healed  Urinary system: Urethral meatus normal, bladder non-tender  Vaginal: normal mucosa without prolapse or lesions  Normal-appearing physiologic discharge  Cervix: Normal-appearing, well-epithelialized, no gross lesions or masses  No cervical motion tenderness  Adnexa: No adnexal masses or tenderness noted  Uterus: Normal-sized, regular contour, midline, mobile, no uterine tenderness  Extremities: Normal range of motion     Skin: normal, no rash or abnormalities  Neurologic: alert, oriented x3  Psychiatric: Appropriate affect, mood stable, cooperative with exam         Marisol Hoang MD  1/9/2023 1:44 PM

## 2023-04-26 ENCOUNTER — OFFICE VISIT (OUTPATIENT)
Dept: POSTPARTUM | Facility: CLINIC | Age: 28
End: 2023-04-26

## 2023-04-26 NOTE — PROGRESS NOTES
BREAST FEEDING FOLLOW UP VISIT    Informant/Relationship: Vanessa/mom    Discussion of Lactation Issues: Tere Isaac states that nursing was not a problem initially, but after introducing a bottle his latch became more shallow  He has always had a preference for one breast over the other  Initially he had a preference for the left breast, then it became the right  Parents decided to focus on the breast vs the bottle and then he developed a real breast preference  He has been in speech therapy where he was noted to have some restricted tongue movement  Karen Castellanos is uncomfortable going to the breast except in a side lying position  He pops off the breast when he is held in a cradle position, football position, or koala position  Infant is 4 months and 1weeks old today      Interval Breastfeeding History:    Frequency of breast feeding: every 2-3 hours with 2 bottles offered while mom is at work (4 days/week)  Does mother feel breastfeeding is effective: Yes  Does infant appear satisfied after nursing:Yes  Stooling pattern normal:Yes  Urinating frequently:Yes  Using shield or shells:No    Alternative/Artificial Feedings:   Bottle: Yes, side lying, paced feeding twice daily, 4 days per week when mom is at work  Cup: No  Syringe/Finger: No           Formula Type: n/a                     Amount: n/a            Breast Milk:                      Amount: 5 oz in one bottle, as much as 2 in the onther            Frequency Q 2-3 Hr between feedings  Elimination Problems: No      Equipment:  Nipple Shield             Type: n/a             Size: n/a             Frequency of Use: n/a  Pump            Type: Spectra S2, Mom Cozy, Manual Medela            Frequency of Use: collects 5 oz at work with one pump; pumps for comfort as needed  Shells            Type: n/a            Frequency of use: n/a    Equipment Problems: no      Mom:  Breast: Normal  Nipple Assessment in General: Normal: elongated/eraser, no discoloration and no damage noted   Mother's Awareness of Feeding Cues                 Recognizes: Yes                  Verbalizes: Yes  Support System: FOB  History of Breastfeeding: none  Changes/Stressors/Violence: Latch on the bottle, tongue tie  Concerns/Goals: Van Day wishes to make nursing and bottle feed easier, finding a position that works best for them  Problems with Mom: None    Physical Exam  Constitutional:       Appearance: Normal appearance  She is well-developed and normal weight  HENT:      Head: Normocephalic and atraumatic  Eyes:      Extraocular Movements: Extraocular movements intact  Neck:      Thyroid: No thyromegaly  Cardiovascular:      Rate and Rhythm: Normal rate and regular rhythm  Pulses: Normal pulses  Heart sounds: Normal heart sounds  No murmur heard  Pulmonary:      Effort: Pulmonary effort is normal       Breath sounds: Normal breath sounds  Musculoskeletal:      Cervical back: Normal range of motion and neck supple  Lymphadenopathy:      Cervical: No cervical adenopathy  Upper Body:      Right upper body: No pectoral adenopathy  Left upper body: No pectoral adenopathy  Neurological:      General: No focal deficit present  Mental Status: She is alert and oriented to person, place, and time  Psychiatric:         Mood and Affect: Mood normal          Behavior: Behavior normal          Thought Content: Thought content normal          Judgment: Judgment normal    Vitals and nursing note reviewed           Infant:  Behaviors: Alert  Birth Weight: 3 24 kg  Today's Weight:6 975 kg    Problems with Infant: Restricted tongue movement; only likes to eat in a side lying position      General Appearance:  Alert, active, no distress                             Head:  Normocephalic, AFOF, sutures opposed                             Eyes:  Conjunctiva clear, no drainage                              Ears:  Normally placed, no anomolies                             Nose:  Septum intact, no drainage or erythema                           Mouth:  No lesions; tongue doesn't lift with crying and has no lateralization; Joni Guallpa is not interested in sucking examiner's finger; frenulum is thick and fibrous with poor elasticity making passive lift of the tongue difficult; median raphe is easily denoted with slight dimpling in narrowed tongue tip                    Neck:  Supple, symmetrical, trachea midline, no adenopathy; thyroid: no enlargement, symmetric, no tenderness/mass/nodules                 Respiratory:  No grunting, flaring, retractions, breath sounds clear and equal            Cardiovascular:  Regular rate and rhythm  No murmur  Adequate perfusion/capillary refill  Femoral pulse present                    Abdomen:   Soft, non-tender, no masses, bowel sounds present, no HSM             Genitourinary:  Normal male, testes descended, no discharge, swelling, or pain, anus patent                          Spine:   No abnormalities noted        Musculoskeletal:  Full range of motion          Skin/Hair/Nails:   Skin warm, dry, and intact, no rashes or abnormal dyspigmentation or lesions                Neurologic:   No abnormal movement, tone appropriate for gestational age    [place] Latch:  Efficiency:               Lips Flanged: Yes, although upper lip curled in until after the frenotomy was done              Depth of latch: Very good              Audible Swallow: Yes              Visible Milk: Yes              Wide Open/ Asymmetrical: Yes, better after frenotomy              Suck Swallow Cycle: Breathing: Unlabored, Coordinated: Yes  Nipple Assessment after latch: Normal: elongated/eraser, no discoloration and no damage noted  Latch Problems: Initially Joni Guallpa attained a wide latch with his upper lip slightly curled in  Over time, his upper lip flanged  He frequently pulled and tugged on the nipple in a traditional cross cradle position   After the frenotomy, a side-lying position was used due to his frustration from the procedure and nursing progressed well with a wide, flanged attachment and good milk transfer    Position:  Infant's Ergonomics/Body               Body Alignment: Yes               Head Supported: Yes               Close to Mom's body/ Lifted/ Supported: Yes               Mom's Ergonomics/Body: Yes                           Supported: Yes                           Sitting Back: Yes                           Brings Baby to her breast: Yes  Positioning Problems: None, although, attachment was still better in the side lying position after the frenotomy, this was at least in part due to overall frustration secondary to the procedure  Education:  Reviewed Latch: Reviewed how to gently compress the breast as if offering a sandwich to facilitate a deeper latch  Reviewed Positioning for Dyad: Reviewed how to bring baby to the breast so that his lower lip and chin touch the breast with his nose just above the nipple to encourage a wider, more asymmetric latch  Reviewed Frequency/Supply & Demand: Recommended feeding on demand: when the baby gives hunger cues, when the breasts feel full, every 3 hours during the day and every 5 hours at night counting from the beginning of one feeding to the beginning of the next; whichever comes first    Reviewed Alternative/Artificial Feedings: Paced bottle feeding        Plan:  Discussed history and physical exams with Benson Bradshaw  Support given for her commitment to providing breast milk for her baby  Discussed the findings on the baby's exam consistent with tongue tie and reviewed how this may be the cause of nipple trauma, nipple pain, nipple damage, poor milk transfer, blocked ducts, mastitis, and loss of milk production  Discussed the science that supports performing the frenotomy to improve latch      I have spent 40 minutes with Patient  today in which greater than 50% of this time was spent in counseling/coordination of care regarding Prognosis, Risks and benefits of tx options, Instructions for management, Patient and family education, Impressions, Counseling / Coordination of care, Documenting in the medical record, Reviewing / ordering tests, medicine, procedures   and Obtaining or reviewing history

## 2023-05-03 ENCOUNTER — OFFICE VISIT (OUTPATIENT)
Dept: POSTPARTUM | Facility: CLINIC | Age: 28
End: 2023-05-03

## 2023-05-03 NOTE — PROGRESS NOTES
BREAST FEEDING FOLLOW UP VISIT    Informant/Relationship: Job Crabtree    Discussion of General Lactation Issues: Job Crabtree feels that some things have gotten better since Froy's frenotomy  He will drink from a bottle now  He still prefers to nurse with Job Crabtree in side lying position but he has fed when she is seated  Infant is 10 months old today  Interval Breastfeeding History:  Frequency of breast feedin times a day  Does mother feel breastfeeding is effective: Yes  Does infant appear satisfied after nursing:Yes  Stooling pattern normal:Yes  Urinating frequently:Yes  Using shield or shells:No    Alternative/Artificial Feedings:   Bottle: Yes, about 3 times a day when Job Crabtree is at work  Cup: No  Syringe/Finger: No           Formula Type: none                     Amount: n/a            Breast Milk:                      Amount: 4-6            Frequency Q ? Hr between feedings  Elimination Problems: No      Equipment:  Nipple Shield             Type: none             Size: n/a             Frequency of Use: n/a  Pump            Type: Mom Cozy S9 Pro            Frequency of Use: 4 times a week  Expresses 10 ounces per session  Shells            Type: none            Frequency of use: n/a    Equipment Problems: no      Mom:  Breast: Not performed today  Nipple Assessment in General: not assessed  Mother's Awareness of Feeding Cues                 Recognizes: Yes                  Verbalizes: Yes  Support System: FOB  History of Breastfeeding: none  Changes/Stressors/Violence: Job Crabtree is concerned that Roseline Subramanian still prefers to nurse in sidelying position  Concerns/Goals: Job Crabtree desires to breastfeed in a seated position    Problems with Mom: none    Physical Exam  Constitutional:       Appearance: Normal appearance  HENT:      Head: Normocephalic and atraumatic  Pulmonary:      Effort: Pulmonary effort is normal    Musculoskeletal:         General: Normal range of motion        Cervical back: Normal range of motion  Neurological:      Mental Status: She is alert and oriented to person, place, and time  Psychiatric:         Mood and Affect: Mood normal          Behavior: Behavior normal          Thought Content: Thought content normal          Judgment: Judgment normal          Infant:  Behaviors: Alert  Color: Pink  Birth weight: 3240gram  Current weight: 6975gram    Problems with infant: tongue tie s/p frenotomy    General Appearance:  Alert, active, no distress                             Head:  Very mild flattening of the occiput, AFOF, sutures opposed                             Eyes:  Conjunctiva clear, no drainage                              Ears:  Normally placed, no anomolies                             Nose:  no drainage or erythema                           Mouth:  No lesions  Recessed chin  Tongue elevates without restriction  Frenotomy wound has healed  Neck:  Supple, symmetrical, trachea midline                 Respiratory:  No grunting, flaring, retractions, breath sounds clear and equal            Cardiovascular:  Regular rate and rhythm  No murmur  Adequate perfusion/capillary refill  Abdomen:   Soft, non-tender, no masses, bowel sounds present, no HSM             Genitourinary:  Normal male, testes descended, no discharge, swelling, or pain, anus patent                          Spine:   No abnormalities noted        Musculoskeletal:  Full range of motion  Very uncomfortable when positioned on his side  Arches his back and lifts his head well off of the exam table  Skin/Hair/Nails:   Skin warm, dry, and intact, no rashes or abnormal dyspigmentation or lesions                Neurologic:   No abnormal movement, tone appropriate        Latch:None  Marney Sender did not want to nurse today      Position:  Infant's Ergonomics/Body               Body Alignment: Yes               Head Supported:  Yes               Close to Mom's body/ Lifted/ Supported: Yes               Mom's Ergonomics/Body: Yes                           Supported: Yes                           Sitting Back: Yes                           Brings Baby to her breast: Yes  Positioning Problems: None      Handouts:   None    Education:  Discussed safe handling and storage of expressed milk  Discussed how to transition baby out of mother's bed for nighttime sleeping      Plan:   Reassurance and support given  Aniceto Nyhan is encouraged by the progress that has been made since Froy's frenotomy  I encouraged her to continue with her current feeding plan  I encouraged her to to keep gently encouraging Froy to feed while she is seated without forcing him  We discussed strategies for transitioning him out of her bed for sleeping  I encouraged her to call with any questions or concerns  I have spent 30 minutes with Patient and family today in which greater than 50% of this time was spent in counseling/coordination of care regarding Patient and family education

## 2023-05-09 NOTE — PROGRESS NOTES
I have reviewed the notes, assessments, and/or procedures performed by Abigail Frias RN, IBCLC, I concur with her/his documentation of Lolly Bullock MD 05/09/23

## 2023-06-07 ENCOUNTER — TELEPHONE (OUTPATIENT)
Dept: POSTPARTUM | Facility: CLINIC | Age: 28
End: 2023-06-07

## 2023-06-07 NOTE — TELEPHONE ENCOUNTER
Called and spoke with Sharath Duval who states she offered her son solids one time per Ped recommendation, baby did not do well with this and since this occurred has has been refusing to take a bottle for Dad while Mom is at work in the evenings  Mom noted that Dad has said to her she may need to quit her job to resolve this problem  Baby has been to [facility] for bottle refusal support, and has seen Speech therapy  Discussed getting back to basics with offering the bottle and reminded of tips discussed at her visit, pacing, side lying positioning, low stimulation environment   Watching baby's cues and how he is communication, ensuring Dad is taking a relaxed approach when offering bottles  Discussed that if bottle refusal continues despite these interventions, some time and patience, she may consider reaching back out to Speech

## 2023-06-07 NOTE — TELEPHONE ENCOUNTER
Quiana Devi called - Pediatrician gave the ok to start on solids, which they introduced mid week last week & since about Friday/Saturday Stu Lundberg (6m) has now been refusing the bottle when Quiana Devi is at work  She is unsure what to do, she also called her Pediatrician (LM asking for advice)

## 2023-07-10 ENCOUNTER — TELEPHONE (OUTPATIENT)
Dept: PSYCHIATRY | Facility: CLINIC | Age: 28
End: 2023-07-10

## 2023-07-10 NOTE — TELEPHONE ENCOUNTER
Attempted to contact the patient to verify the needs of services. Left a voicemail for the patient to contact the intake department for assistance.

## 2023-10-18 ENCOUNTER — TELEPHONE (OUTPATIENT)
Dept: POSTPARTUM | Facility: CLINIC | Age: 28
End: 2023-10-18

## 2023-10-18 NOTE — TELEPHONE ENCOUNTER
Lenin Jensen called - she has a cold & asking what OTC medications are safe to take while breastfeeding Froy (10m).

## 2023-10-18 NOTE — TELEPHONE ENCOUNTER
Najma Gomez is experiencing a cold, runny nose, mucous draining into her stomach. She was told at the pharmacy she is not able to take anything other than tylenol while nursing. Calling to clarify and get recommendations. I shared this with her :     Cold Medications   Avoid all oral and intranasal products with decongestants such as pseudoephedrine and phenylephrine, because these can cause a significant decrease in milk production. Preferred medications are:   1) Guaifenesin for nasal congestion and cough   2) Dextromethorphan for a cough   3) Nasal saline preparations     Encouraged her to continue to call for questions as needed.

## 2023-10-19 ENCOUNTER — TELEPHONE (OUTPATIENT)
Dept: POSTPARTUM | Facility: CLINIC | Age: 28
End: 2023-10-19

## 2023-10-19 NOTE — TELEPHONE ENCOUNTER
Kike Humphreys called - she went to her PCP & was dx with Strep & prescribed antibiotics - she would like to verify ok to take while nursing Froy (10m).

## 2023-10-19 NOTE — TELEPHONE ENCOUNTER
Hank Russo was prescribed Amoxicillin, reassured her this is safe to take while breastfeeding. Rated L1 per Danika Delio.

## 2024-02-02 ENCOUNTER — TELEPHONE (OUTPATIENT)
Dept: POSTPARTUM | Facility: CLINIC | Age: 29
End: 2024-02-02

## 2024-02-02 NOTE — TELEPHONE ENCOUNTER
Returned call, left VM for Vanessa letting her know Ashwagandha is safe to take while breastfeeding.

## 2024-02-02 NOTE — TELEPHONE ENCOUNTER
Vanessa called - she is asking if ok to take Ashwagandha while nursing Froy (14m).  Said  takes for mood & she wants to try to help her mood, she wants to stay as natural as she can & no prescription meds for mood.    Please leave a message on voice mail for her with info.  She will be unable to answer the phone.

## 2024-07-16 NOTE — PATIENT INSTRUCTIONS
Caller: Aislinn    Doctor: Fabian    Reason for call: patient will be faxing over pain diary later today.     To Pain diary Fax# [phone]     Continue with your current feeding plan  Please call with any questions or concerns

## 2025-04-10 ENCOUNTER — TELEPHONE (OUTPATIENT)
Dept: POSTPARTUM | Facility: CLINIC | Age: 30
End: 2025-04-10

## 2025-04-10 NOTE — TELEPHONE ENCOUNTER
Vanessa called - she is having trouble with bottle feeding & looking for tips on having her 3mth old accept the bottle.

## 2025-04-10 NOTE — TELEPHONE ENCOUNTER
"Fredrick was bottle fed from the very beginning because he required supplementation. He was sleepy but appeared to be able to feed effectively from the bottle.  For the first month, he was bottle fed fairly regularly.  In the second month, he still  bottle fed at times but not as frequently.  Eventually, he was getting one bottle every night before bed. Recently, he is refusing his bottle.  He uses his tongue to push the nipple out of his mouth.    They are trying to offer the bottle at the normal bedtime routine and Vanessa is attempting several times a day as well. She is trying about 5 times a day.  Most recently, he is crying when the bottle is offered.  Vanessa is offering freshly expresses but refrigerated milk.  It is important that he accept the bottle because Vanessa will be having her wisdom teeth removed an was told by her oral surgeon that she will need to \"pump and dump\" for 24 hours.   I suggested that Vanessa take a break from attempting bottle feeding for a few days to allow Fredrick and her to lower the stress levels regarding bottle feeding.  I suggested that when she tries again, to attempt just as Fredrick is waking from a nap but is still sleepy.  I suggested she try a side lying position for paced feeding (like she used with her older child) to mimic as much as she can a breastfeeding position for feeding.  I explained that this position is also more comfortable for a baby who seems overwhelmed by faster flow.  I also suggested she can try cup feeding if Fredrick continues to refuse bottles.  I suggested that she request a speech therapy evaluation if Fredrick continues to struggle with bottle feeding.    I reassured Vanessa that it is not necessary to suspend breastfeeding after conscious sedation or even general anesthesia. I explained that these medications leave the body very quickly so as soon as she is awake and aware enough to safely hold Fredrick,it is safe to breastfeed.    I encouraged her to " call back with any additional questions or concerns.